# Patient Record
Sex: MALE | Race: WHITE | NOT HISPANIC OR LATINO | Employment: OTHER | URBAN - NONMETROPOLITAN AREA
[De-identification: names, ages, dates, MRNs, and addresses within clinical notes are randomized per-mention and may not be internally consistent; named-entity substitution may affect disease eponyms.]

---

## 2017-02-14 ENCOUNTER — APPOINTMENT (OUTPATIENT)
Dept: PHYSICAL THERAPY | Facility: HOSPITAL | Age: 77
End: 2017-02-14

## 2017-02-22 ENCOUNTER — HOSPITAL ENCOUNTER (OUTPATIENT)
Dept: PHYSICAL THERAPY | Facility: HOSPITAL | Age: 77
Setting detail: THERAPIES SERIES
Discharge: HOME | End: 2017-02-22

## 2017-02-22 DIAGNOSIS — M79.604 LUMBAR PAIN WITH RADIATION DOWN RIGHT LEG: Primary | ICD-10-CM

## 2017-02-22 DIAGNOSIS — M54.50 LUMBAR PAIN WITH RADIATION DOWN RIGHT LEG: Primary | ICD-10-CM

## 2017-02-22 DIAGNOSIS — G89.29 CHRONIC PAIN OF RIGHT KNEE: ICD-10-CM

## 2017-02-22 DIAGNOSIS — M25.561 CHRONIC PAIN OF RIGHT KNEE: ICD-10-CM

## 2017-02-22 PROCEDURE — 97010 HOT OR COLD PACKS THERAPY: CPT

## 2017-02-22 PROCEDURE — 97163 PT EVAL HIGH COMPLEX 45 MIN: CPT

## 2017-02-22 PROCEDURE — G0283 ELEC STIM OTHER THAN WOUND: HCPCS

## 2017-02-22 NOTE — PROGRESS NOTES
Outpatient Physical Therapy Ortho Initial Evaluation  MICKY Lacey     Patient Name: Leodan Posadas  : 1940  MRN: [id number]  Today's Date: 2017      Visit Date: 2017    Patient Active Problem List   Diagnosis   • CAD (coronary artery disease), native coronary artery   • Hyperlipidemia   • Hypertension   • PAC (premature atrial contraction)   • Medication intolerance   • Status post coronary artery stent placement        Past Medical History   Diagnosis Date   • CAD (coronary artery disease)      s/p CABG with occluded grafts   • Hyperlipidemia    • Hypertension         Past Surgical History   Procedure Laterality Date   • Coronary artery bypass graft         Visit Dx:     ICD-10-CM ICD-9-CM   1. Lumbar pain with radiation down right leg M54.5 724.2   2. Chronic pain of right knee M25.561 719.46    G89.29 338.29             Patient History       17 1300          History    Chief Complaint Difficulty Walking;Pain;Joint stiffness;Difficulty with daily activities  -RT      Type of Pain Back pain;Lower Extremity / Leg;Knee pain   right  -RT      Date Current Problem(s) Began --   Summer 2004  -RT      Brief Description of Current Complaint Pt suffered a lumbar and right knee injury while lifting a faulty door while working at [employer] during the Summer of 2004.  Pt reports his pain has increased over the past six months.  The patient was recently evaluated by MD Quevedo and was advised to initiate therapy.  -RT      Patient/Caregiver Goals Relieve pain;Return to prior level of function;Improve mobility;Improve strength  -RT      Current Tobacco Use no  -RT      Smoking Status no  -RT      Patient's Rating of General Health Good  -RT      Hand Dominance right-handed  -RT      How has patient tried to help current problem? meds, rest, heat  -RT      What clinical tests have you had for this problem? X-ray  -RT      Results of Clinical Tests oa right knee  -RT      Pain     Pain Location  Back;Knee  -RT      Pain at Present 8  -RT      Pain at Best 5  -RT      Pain at Worst 10  -RT      Pain Frequency Constant/continuous  -RT      Pain Description Aching;Stabbing  -RT      What Performance Factors Make the Current Problem(s) WORSE? standing, walking, squatting  -RT      Tolerance Time- Standing 5min  -RT      Tolerance Time- Sitting 15min  -RT      Fall Risk Assessment    Any falls in the past year: Yes  -RT      Number of falls reported in the last 12 months 5  -RT      Factors that contributed to the fall: Lost balance;Tripped  -RT      Daily Activities    Primary Language English  -RT      How does patient learn best? Listening;Reading;Demonstration;Pictures/Video  -RT      Safety    Are you being hurt, hit, or frightened by anyone at home or in your life? No  -RT      Are you being neglected by a caregiver No  -RT        User Key  (r) = Recorded By, (t) = Taken By, (c) = Cosigned By    Initials Name Provider Type    RT Giuliano Arora, PT Physical Therapist                PT Ortho       02/22/17 1300    Posture/Observations    Posture/Observations Comments increased thoracic kyphosis, left side leaning, righ tmedial and lateral knee edema;   -RT    Neural Tension Signs- Lower Quarter Clearing    Slump Right:;Positive  -RT    Sensory Screen for Light Touch- Lower Quarter Clearing    L1 (inguinal area) Bilateral:;Intact  -RT    L2 (anterior mid thigh) Bilateral:;Intact  -RT    L3 (distal anterior thigh) Bilateral:;Intact  -RT    L4 (medial lower leg/foot) Bilateral:;Intact  -RT    L5 (lateral lower leg/great toe) Bilateral:;Intact  -RT    S1 (bottom of foot) Bilateral:;Intact  -RT    Myotomal Screen- Lower Quarter Clearing    Hip flexion (L2) Right:;4- (Good -);Left:;4 (Good)  -RT    Knee extension (L3) Right:;3+ (Fair +);Left:;4 (Good)  -RT    Knee flexion (S2) Right:;3+ (Fair +);Left:;4 (Good)  -RT    Lumbosacral Palpation    Lumbosacral Palpation? --   tenderness noted along l5-S1; right knee  med and lat jt line  -RT    ROM (Range of Motion)    General ROM Detail left knee 0-122; right -17-92  -RT      User Key  (r) = Recorded By, (t) = Taken By, (c) = Cosigned By    Initials Name Provider Type    RT Giuliano Arora PT Physical Therapist                            Therapy Education       02/22/17 1447    Therapy Education    Given HEP;Symptoms/condition management;Pain management;Posture/body mechanics  -RT    Program New  -RT    How Provided Verbal;Demonstration;Written  -RT    Provided to Patient  -RT    Level of Understanding Teach back education performed;Verbalized;Demonstrated  -RT      User Key  (r) = Recorded By, (t) = Taken By, (c) = Cosigned By    Initials Name Provider Type    RT Giuliano Arora PT Physical Therapist                PT OP Goals       02/22/17 1400          PT Short Term Goals    STG Date to Achieve 03/08/17  -RT      STG 1 Pt will be instructed in a HEP.  -RT      STG 1 Progress New  -RT      STG 2 Pt will demonstrate right knee ROM .  -RT      STG 2 Progress New  -RT      STG 3 Pt will report pain at average to be 5/10.  -RT      STG 3 Progress New  -RT      Long Term Goals    LTG Date to Achieve 03/22/17  -RT      LTG 1 Pt will be instructed in lifting precuations.  -RT      LTG 1 Progress New  -RT      LTG 2 Pt will demonstrate 4/5 right knee strength.  -RT      LTG 2 Progress New  -RT      LTG 3 Pt will report pain no greater than 5/10 at worst.  -RT      LTG 3 Progress New  -RT      Time Calculation    PT Goal Re-Cert Due Date 03/22/17  -RT        User Key  (r) = Recorded By, (t) = Taken By, (c) = Cosigned By    Initials Name Provider Type    RT Giuliano Arora, PT Physical Therapist                PT Assessment/Plan       02/22/17 1451          PT Assessment    Functional Limitations Performance in leisure activities;Performance in self-care ADL;Limitations in functional capacity and performance  -RT      Impairments Pain;Balance;Gait;Range of motion;Poor body  mechanics;Muscle strength;Sensation  -RT      Assessment Comments Pt is a 78 y/o male referred to therapy for treatment of back and right knee pain.  Pt presents with decreased knee ROM, increased lumbar and knee pain and decreased core and knee stability.  Pt will benefit from therapy services for improved ADLs and decreased pain.   -RT      Rehab Potential Good  -RT      Patient/caregiver participated in establishment of treatment plan and goals Yes  -RT      Patient would benefit from skilled therapy intervention Yes  -RT      PT Plan    PT Frequency 2x/week  -RT      Predicted Duration of Therapy Intervention (days/wks) 4 weeks  -RT      Planned CPT's? PT EVAL: 68786;PT RE-EVAL: 97983;PT THER PROC EA 15 MIN: 28746;PT THER ACT EA 15 MIN: 42134;PT MANUAL THERAPY EA 15 MIN: 30299;PT NEUROMUSC RE-EDUCATION EA 15 MIN: 00720;PT GAIT TRAINING EA 15 MIN: 47318;PT ELECTRICAL STIM UNATTEND: ;PT ULTRASOUND EA 15 MIN: 53055;PT HOT/COLD PACK WC NONMCARE: 97114;PT TRACTION LUMBAR: 77830  -RT      Physical Therapy Interventions (Optional Details) gait training;neuromuscular re-education;modalities;manual therapy techniques;lumbar stabilization;joint mobilization;home exercise program;patient/family education;postural re-education;ROM (Range of Motion);strengthening;stretching  -RT      PT Plan Comments Will follow for optimal gains.  -RT        User Key  (r) = Recorded By, (t) = Taken By, (c) = Cosigned By    Initials Name Provider Type    RT Giuliano Arora PT Physical Therapist                                  Time Calculation:   Start Time: 1320  Stop Time: 1425  Time Calculation (min): 65 min     Therapy Charges for Today     Code Description Service Date Service Provider Modifiers Qty    78801478242 HC PT EVAL HIGH COMPLEXITY 3 2/22/2017 Giuliano Arora, PT GP 1    18081584311 HC ELECTRICAL STIM UNATTENDED 2/22/2017 Giuliano Arora, PT  1    78273209156 HC PT HOT/COLD PACK WC NONMCARE 2/22/2017 Giuliano Arora PT  GP 1                    Giuliano rAora, PT  2/22/2017

## 2017-02-28 ENCOUNTER — HOSPITAL ENCOUNTER (OUTPATIENT)
Dept: PHYSICAL THERAPY | Facility: HOSPITAL | Age: 77
Setting detail: THERAPIES SERIES
Discharge: HOME | End: 2017-02-28

## 2017-02-28 DIAGNOSIS — M54.50 LUMBAR PAIN WITH RADIATION DOWN RIGHT LEG: Primary | ICD-10-CM

## 2017-02-28 DIAGNOSIS — M79.604 LUMBAR PAIN WITH RADIATION DOWN RIGHT LEG: Primary | ICD-10-CM

## 2017-02-28 DIAGNOSIS — M25.561 CHRONIC PAIN OF RIGHT KNEE: ICD-10-CM

## 2017-02-28 DIAGNOSIS — G89.29 CHRONIC PAIN OF RIGHT KNEE: ICD-10-CM

## 2017-02-28 PROCEDURE — 97035 APP MDLTY 1+ULTRASOUND EA 15: CPT

## 2017-02-28 PROCEDURE — 97110 THERAPEUTIC EXERCISES: CPT

## 2017-02-28 PROCEDURE — 97010 HOT OR COLD PACKS THERAPY: CPT

## 2017-02-28 PROCEDURE — G0283 ELEC STIM OTHER THAN WOUND: HCPCS

## 2017-03-10 ENCOUNTER — APPOINTMENT (OUTPATIENT)
Dept: PHYSICAL THERAPY | Facility: HOSPITAL | Age: 77
End: 2017-03-10

## 2017-03-13 ENCOUNTER — TELEPHONE (OUTPATIENT)
Dept: CARDIOLOGY | Facility: CLINIC | Age: 77
End: 2017-03-13

## 2017-03-21 RX ORDER — CLOPIDOGREL BISULFATE 75 MG/1
75 TABLET ORAL DAILY
Qty: 30 TABLET | Refills: 11 | Status: SHIPPED | OUTPATIENT
Start: 2017-03-21

## 2017-03-22 ENCOUNTER — DOCUMENTATION (OUTPATIENT)
Dept: PHYSICAL THERAPY | Facility: HOSPITAL | Age: 77
End: 2017-03-22

## 2017-03-22 DIAGNOSIS — M54.50 LUMBAR PAIN WITH RADIATION DOWN RIGHT LEG: Primary | ICD-10-CM

## 2017-03-22 DIAGNOSIS — G89.29 CHRONIC PAIN OF RIGHT KNEE: ICD-10-CM

## 2017-03-22 DIAGNOSIS — M25.561 CHRONIC PAIN OF RIGHT KNEE: ICD-10-CM

## 2017-03-22 DIAGNOSIS — M79.604 LUMBAR PAIN WITH RADIATION DOWN RIGHT LEG: Primary | ICD-10-CM

## 2017-03-22 NOTE — THERAPY DISCHARGE NOTE
Outpatient Physical Therapy Discharge Summary         Patient Name: Leodan Posadas  : 1940  MRN: [id number]    Today's Date: 3/22/2017    Visit Dx:    ICD-10-CM ICD-9-CM   1. Lumbar pain with radiation down right leg M54.5 724.2   2. Chronic pain of right knee M25.561 719.46    G89.29 338.29           OP PT Discharge Summary  Date of Discharge: 17  Reason for Discharge: Unable to pay/insurance denied care  Outcomes Achieved: Unable to make functional progress toward goals at this time  Discharge Instructions: Insurance denied visits and pt will be discharged at this time.      Time Calculation:                    Giuliano Arora, PT  3/22/2017

## 2017-03-27 ENCOUNTER — TELEPHONE (OUTPATIENT)
Dept: CARDIOLOGY | Facility: CLINIC | Age: 77
End: 2017-03-27

## 2017-05-29 ENCOUNTER — HOSPITAL ENCOUNTER (OUTPATIENT)
Facility: HOSPITAL | Age: 77
Setting detail: OBSERVATION
Discharge: HOME | End: 2017-05-30
Attending: FAMILY MEDICINE | Admitting: FAMILY MEDICINE

## 2017-05-29 ENCOUNTER — APPOINTMENT (OUTPATIENT)
Dept: GENERAL RADIOLOGY | Facility: HOSPITAL | Age: 77
End: 2017-05-29

## 2017-05-29 DIAGNOSIS — R07.9 CHEST PAIN WITH MODERATE RISK FOR CARDIAC ETIOLOGY: Primary | ICD-10-CM

## 2017-05-29 LAB
6-ACETYL MORPHINE: NEGATIVE
ALBUMIN SERPL-MCNC: 4.4 G/DL (ref 3.4–4.8)
ALBUMIN/GLOB SERPL: 1.5 G/DL (ref 1.5–2.5)
ALP SERPL-CCNC: 74 U/L (ref 40–129)
ALT SERPL W P-5'-P-CCNC: 11 U/L (ref 10–44)
AMPHET+METHAMPHET UR QL: NEGATIVE
ANION GAP SERPL CALCULATED.3IONS-SCNC: 6.9 MMOL/L (ref 3.6–11.2)
APTT PPP: 28.2 SECONDS (ref 24.4–31)
AST SERPL-CCNC: 17 U/L (ref 10–34)
BARBITURATES UR QL SCN: NEGATIVE
BASOPHILS # BLD AUTO: 0.03 10*3/MM3 (ref 0–0.3)
BASOPHILS NFR BLD AUTO: 0.3 % (ref 0–2)
BENZODIAZ UR QL SCN: NEGATIVE
BILIRUB SERPL-MCNC: 0.4 MG/DL (ref 0.2–1.8)
BILIRUB UR QL STRIP: NEGATIVE
BUN BLD-MCNC: 17 MG/DL (ref 7–21)
BUN/CREAT SERPL: 16 (ref 7–25)
BUPRENORPHINE SERPL-MCNC: NEGATIVE NG/ML
CALCIUM SPEC-SCNC: 9.7 MG/DL (ref 7.7–10)
CANNABINOIDS SERPL QL: NEGATIVE
CHLORIDE SERPL-SCNC: 106 MMOL/L (ref 99–112)
CK MB SERPL-CCNC: 1.13 NG/ML (ref 0–5)
CK MB SERPL-CCNC: 1.42 NG/ML (ref 0–5)
CK MB SERPL-CCNC: 1.59 NG/ML (ref 0–5)
CK MB SERPL-RTO: 2.2 % (ref 0–3)
CK MB SERPL-RTO: 2.4 % (ref 0–3)
CK MB SERPL-RTO: 2.8 % (ref 0–3)
CK SERPL-CCNC: 51 U/L (ref 24–204)
CK SERPL-CCNC: 56 U/L (ref 24–204)
CK SERPL-CCNC: 59 U/L (ref 24–204)
CLARITY UR: CLEAR
CO2 SERPL-SCNC: 30.1 MMOL/L (ref 24.3–31.9)
COCAINE UR QL: NEGATIVE
COLOR UR: YELLOW
CREAT BLD-MCNC: 1.06 MG/DL (ref 0.43–1.29)
DEPRECATED RDW RBC AUTO: 44 FL (ref 37–54)
EOSINOPHIL # BLD AUTO: 0.19 10*3/MM3 (ref 0–0.7)
EOSINOPHIL NFR BLD AUTO: 2.1 % (ref 0–7)
ERYTHROCYTE [DISTWIDTH] IN BLOOD BY AUTOMATED COUNT: 13.6 % (ref 11.5–14.5)
GFR SERPL CREATININE-BSD FRML MDRD: 68 ML/MIN/1.73
GLOBULIN UR ELPH-MCNC: 2.9 GM/DL
GLUCOSE BLD-MCNC: 102 MG/DL (ref 70–110)
GLUCOSE UR STRIP-MCNC: NEGATIVE MG/DL
HCT VFR BLD AUTO: 46 % (ref 42–52)
HGB BLD-MCNC: 15.3 G/DL (ref 14–18)
HGB UR QL STRIP.AUTO: NEGATIVE
IMM GRANULOCYTES # BLD: 0.02 10*3/MM3 (ref 0–0.03)
IMM GRANULOCYTES NFR BLD: 0.2 % (ref 0–0.5)
INR PPP: 0.92 (ref 0.8–1.1)
KETONES UR QL STRIP: NEGATIVE
LEUKOCYTE ESTERASE UR QL STRIP.AUTO: NEGATIVE
LYMPHOCYTES # BLD AUTO: 2.81 10*3/MM3 (ref 1–3)
LYMPHOCYTES NFR BLD AUTO: 31.6 % (ref 16–46)
MCH RBC QN AUTO: 29.5 PG (ref 27–33)
MCHC RBC AUTO-ENTMCNC: 33.3 G/DL (ref 33–37)
MCV RBC AUTO: 88.6 FL (ref 80–94)
MDMA UR QL SCN: NEGATIVE
METHADONE UR QL SCN: NEGATIVE
MONOCYTES # BLD AUTO: 0.88 10*3/MM3 (ref 0.1–0.9)
MONOCYTES NFR BLD AUTO: 9.9 % (ref 0–12)
NEUTROPHILS # BLD AUTO: 4.96 10*3/MM3 (ref 1.4–6.5)
NEUTROPHILS NFR BLD AUTO: 55.9 % (ref 40–75)
NITRITE UR QL STRIP: NEGATIVE
OPIATES UR QL: NEGATIVE
OSMOLALITY SERPL CALC.SUM OF ELEC: 286.7 MOSM/KG (ref 273–305)
OXYCODONE UR QL SCN: NEGATIVE
PCP UR QL SCN: NEGATIVE
PH UR STRIP.AUTO: 5.5 [PH] (ref 5–8)
PLATELET # BLD AUTO: 206 10*3/MM3 (ref 130–400)
PMV BLD AUTO: 11.8 FL (ref 6–10)
POTASSIUM BLD-SCNC: 4.3 MMOL/L (ref 3.5–5.3)
PROT SERPL-MCNC: 7.3 G/DL (ref 6–8)
PROT UR QL STRIP: NEGATIVE
PROTHROMBIN TIME: 10.1 SECONDS (ref 9.8–11.9)
RBC # BLD AUTO: 5.19 10*6/MM3 (ref 4.7–6.1)
SODIUM BLD-SCNC: 143 MMOL/L (ref 135–153)
SP GR UR STRIP: <=1.005 (ref 1–1.03)
TROPONIN I SERPL-MCNC: <0.006 NG/ML
UROBILINOGEN UR QL STRIP: NORMAL
WBC NRBC COR # BLD: 8.89 10*3/MM3 (ref 4.5–12.5)

## 2017-05-29 PROCEDURE — 85025 COMPLETE CBC W/AUTO DIFF WBC: CPT | Performed by: FAMILY MEDICINE

## 2017-05-29 PROCEDURE — 80307 DRUG TEST PRSMV CHEM ANLYZR: CPT | Performed by: FAMILY MEDICINE

## 2017-05-29 PROCEDURE — 93005 ELECTROCARDIOGRAM TRACING: CPT | Performed by: FAMILY MEDICINE

## 2017-05-29 PROCEDURE — 93010 ELECTROCARDIOGRAM REPORT: CPT | Performed by: INTERNAL MEDICINE

## 2017-05-29 PROCEDURE — 82550 ASSAY OF CK (CPK): CPT | Performed by: FAMILY MEDICINE

## 2017-05-29 PROCEDURE — 99285 EMERGENCY DEPT VISIT HI MDM: CPT

## 2017-05-29 PROCEDURE — 71010 XR CHEST 1 VW: CPT | Performed by: RADIOLOGY

## 2017-05-29 PROCEDURE — 84484 ASSAY OF TROPONIN QUANT: CPT | Performed by: FAMILY MEDICINE

## 2017-05-29 PROCEDURE — 85730 THROMBOPLASTIN TIME PARTIAL: CPT | Performed by: FAMILY MEDICINE

## 2017-05-29 PROCEDURE — 81003 URINALYSIS AUTO W/O SCOPE: CPT | Performed by: FAMILY MEDICINE

## 2017-05-29 PROCEDURE — 25010000002 ENOXAPARIN PER 10 MG: Performed by: FAMILY MEDICINE

## 2017-05-29 PROCEDURE — 80053 COMPREHEN METABOLIC PANEL: CPT | Performed by: FAMILY MEDICINE

## 2017-05-29 PROCEDURE — 71010 HC CHEST PA OR AP: CPT

## 2017-05-29 PROCEDURE — 82553 CREATINE MB FRACTION: CPT | Performed by: FAMILY MEDICINE

## 2017-05-29 PROCEDURE — 85610 PROTHROMBIN TIME: CPT | Performed by: FAMILY MEDICINE

## 2017-05-29 PROCEDURE — G0378 HOSPITAL OBSERVATION PER HR: HCPCS

## 2017-05-29 PROCEDURE — 87086 URINE CULTURE/COLONY COUNT: CPT | Performed by: FAMILY MEDICINE

## 2017-05-29 PROCEDURE — 99204 OFFICE O/P NEW MOD 45 MIN: CPT | Performed by: INTERNAL MEDICINE

## 2017-05-29 RX ORDER — METOPROLOL SUCCINATE 25 MG/1
12.5 TABLET, EXTENDED RELEASE ORAL NIGHTLY
Status: DISCONTINUED | OUTPATIENT
Start: 2017-05-29 | End: 2017-05-30 | Stop reason: HOSPADM

## 2017-05-29 RX ORDER — METOPROLOL SUCCINATE 25 MG/1
25 TABLET, EXTENDED RELEASE ORAL DAILY
Status: DISCONTINUED | OUTPATIENT
Start: 2017-05-30 | End: 2017-05-30 | Stop reason: HOSPADM

## 2017-05-29 RX ORDER — ASPIRIN 81 MG/1
324 TABLET, CHEWABLE ORAL ONCE
Status: COMPLETED | OUTPATIENT
Start: 2017-05-29 | End: 2017-05-29

## 2017-05-29 RX ORDER — LANSOPRAZOLE 30 MG/1
30 CAPSULE, DELAYED RELEASE ORAL 2 TIMES DAILY
Status: CANCELLED | OUTPATIENT
Start: 2017-05-29

## 2017-05-29 RX ORDER — ENALAPRIL MALEATE 2.5 MG/1
2.5 TABLET ORAL DAILY
Status: DISCONTINUED | OUTPATIENT
Start: 2017-05-30 | End: 2017-05-30 | Stop reason: HOSPADM

## 2017-05-29 RX ORDER — HYDROCODONE BITARTRATE AND ACETAMINOPHEN 10; 325 MG/1; MG/1
1 TABLET ORAL DAILY PRN
Status: ON HOLD | COMMUNITY
End: 2017-05-29

## 2017-05-29 RX ORDER — SODIUM CHLORIDE 9 MG/ML
75 INJECTION, SOLUTION INTRAVENOUS CONTINUOUS
Status: DISCONTINUED | OUTPATIENT
Start: 2017-05-29 | End: 2017-05-30

## 2017-05-29 RX ORDER — SODIUM CHLORIDE 0.9 % (FLUSH) 0.9 %
10 SYRINGE (ML) INJECTION AS NEEDED
Status: DISCONTINUED | OUTPATIENT
Start: 2017-05-29 | End: 2017-05-30 | Stop reason: HOSPADM

## 2017-05-29 RX ORDER — LANSOPRAZOLE 30 MG/1
30 CAPSULE, DELAYED RELEASE ORAL
Status: DISCONTINUED | OUTPATIENT
Start: 2017-05-29 | End: 2017-05-30 | Stop reason: HOSPADM

## 2017-05-29 RX ORDER — METOPROLOL SUCCINATE 25 MG/1
12.5 TABLET, EXTENDED RELEASE ORAL 2 TIMES DAILY
COMMUNITY

## 2017-05-29 RX ORDER — CLOPIDOGREL BISULFATE 75 MG/1
75 TABLET ORAL DAILY
Status: DISCONTINUED | OUTPATIENT
Start: 2017-05-30 | End: 2017-05-30 | Stop reason: HOSPADM

## 2017-05-29 RX ORDER — HYDROCODONE BITARTRATE AND ACETAMINOPHEN 10; 325 MG/1; MG/1
1 TABLET ORAL DAILY PRN
Status: CANCELLED | OUTPATIENT
Start: 2017-05-29

## 2017-05-29 RX ADMIN — SODIUM CHLORIDE 75 ML/HR: 900 INJECTION, SOLUTION INTRAVENOUS at 15:47

## 2017-05-29 RX ADMIN — ASPIRIN 324 MG: 81 TABLET, CHEWABLE ORAL at 09:32

## 2017-05-29 RX ADMIN — ENOXAPARIN SODIUM 40 MG: 40 INJECTION SUBCUTANEOUS at 17:14

## 2017-05-29 RX ADMIN — METOPROLOL SUCCINATE 12.5 MG: 25 TABLET, FILM COATED, EXTENDED RELEASE ORAL at 19:42

## 2017-05-30 ENCOUNTER — APPOINTMENT (OUTPATIENT)
Dept: NUCLEAR MEDICINE | Facility: HOSPITAL | Age: 77
End: 2017-05-30
Attending: INTERNAL MEDICINE

## 2017-05-30 ENCOUNTER — APPOINTMENT (OUTPATIENT)
Dept: CARDIOLOGY | Facility: HOSPITAL | Age: 77
End: 2017-05-30
Attending: INTERNAL MEDICINE

## 2017-05-30 VITALS
BODY MASS INDEX: 26.78 KG/M2 | WEIGHT: 180.8 LBS | OXYGEN SATURATION: 97 % | DIASTOLIC BLOOD PRESSURE: 80 MMHG | SYSTOLIC BLOOD PRESSURE: 143 MMHG | HEART RATE: 75 BPM | TEMPERATURE: 97.9 F | RESPIRATION RATE: 20 BRPM | HEIGHT: 69 IN

## 2017-05-30 LAB
BH CV ECHO MEAS - % IVS THICK: 48.1 %
BH CV ECHO MEAS - % LVPW THICK: 37.5 %
BH CV ECHO MEAS - ACS: 2.3 CM
BH CV ECHO MEAS - AO ROOT AREA (BSA CORRECTED): 1.7
BH CV ECHO MEAS - AO ROOT AREA: 9.2 CM^2
BH CV ECHO MEAS - AO ROOT DIAM: 3.4 CM
BH CV ECHO MEAS - BSA(HAYCOCK): 2 M^2
BH CV ECHO MEAS - BSA: 2 M^2
BH CV ECHO MEAS - BZI_BMI: 26.6 KILOGRAMS/M^2
BH CV ECHO MEAS - BZI_METRIC_HEIGHT: 175.3 CM
BH CV ECHO MEAS - BZI_METRIC_WEIGHT: 81.6 KG
BH CV ECHO MEAS - CONTRAST EF 4CH: 52.2 ML/M^2
BH CV ECHO MEAS - EDV(CUBED): 133.2 ML
BH CV ECHO MEAS - EDV(MOD-SP4): 23 ML
BH CV ECHO MEAS - EDV(TEICH): 124.2 ML
BH CV ECHO MEAS - EF(CUBED): 56.9 %
BH CV ECHO MEAS - EF(MOD-SP4): 52.2 %
BH CV ECHO MEAS - EF(TEICH): 48.3 %
BH CV ECHO MEAS - ESV(CUBED): 57.4 ML
BH CV ECHO MEAS - ESV(MOD-SP4): 11 ML
BH CV ECHO MEAS - ESV(TEICH): 64.2 ML
BH CV ECHO MEAS - FS: 24.5 %
BH CV ECHO MEAS - IVS/LVPW: 0.86
BH CV ECHO MEAS - IVSD: 0.95 CM
BH CV ECHO MEAS - IVSS: 1.4 CM
BH CV ECHO MEAS - LA DIMENSION: 2.6 CM
BH CV ECHO MEAS - LA/AO: 0.74
BH CV ECHO MEAS - LV DIASTOLIC VOL/BSA (35-75): 11.6 ML/M^2
BH CV ECHO MEAS - LV MASS(C)D: 197.1 GRAMS
BH CV ECHO MEAS - LV MASS(C)DI: 99.8 GRAMS/M^2
BH CV ECHO MEAS - LV MASS(C)S: 214.7 GRAMS
BH CV ECHO MEAS - LV MASS(C)SI: 108.7 GRAMS/M^2
BH CV ECHO MEAS - LV SYSTOLIC VOL/BSA (12-30): 5.6 ML/M^2
BH CV ECHO MEAS - LVIDD: 5.1 CM
BH CV ECHO MEAS - LVIDS: 3.9 CM
BH CV ECHO MEAS - LVLD AP4: 5.2 CM
BH CV ECHO MEAS - LVLS AP4: 5 CM
BH CV ECHO MEAS - LVOT AREA (M): 3.1 CM^2
BH CV ECHO MEAS - LVOT AREA: 3.2 CM^2
BH CV ECHO MEAS - LVOT DIAM: 2 CM
BH CV ECHO MEAS - LVPWD: 1.1 CM
BH CV ECHO MEAS - LVPWS: 1.5 CM
BH CV ECHO MEAS - MV A MAX VEL: 92.8 CM/SEC
BH CV ECHO MEAS - MV E MAX VEL: 76.5 CM/SEC
BH CV ECHO MEAS - MV E/A: 0.82
BH CV ECHO MEAS - PA ACC SLOPE: 1203 CM/SEC^2
BH CV ECHO MEAS - PA ACC TIME: 0.07 SEC
BH CV ECHO MEAS - PA PR(ACCEL): 47.3 MMHG
BH CV ECHO MEAS - RAP SYSTOLE: 10 MMHG
BH CV ECHO MEAS - RVSP: 36.5 MMHG
BH CV ECHO MEAS - SI(CUBED): 38.4 ML/M^2
BH CV ECHO MEAS - SI(MOD-SP4): 6.1 ML/M^2
BH CV ECHO MEAS - SI(TEICH): 30.4 ML/M^2
BH CV ECHO MEAS - SV(CUBED): 75.8 ML
BH CV ECHO MEAS - SV(MOD-SP4): 12 ML
BH CV ECHO MEAS - SV(TEICH): 60 ML
BH CV ECHO MEAS - TR MAX VEL: 257.2 CM/SEC
BH CV NUCLEAR PRIOR STUDY: 3
BH CV STRESS BP STAGE 1: NORMAL
BH CV STRESS BP STAGE 2: NORMAL
BH CV STRESS DURATION MIN STAGE 1: 3
BH CV STRESS DURATION MIN STAGE 2: 3
BH CV STRESS DURATION SEC STAGE 1: 0
BH CV STRESS DURATION SEC STAGE 2: 0
BH CV STRESS GRADE STAGE 1: 10
BH CV STRESS GRADE STAGE 2: 12
BH CV STRESS HR STAGE 1: 120
BH CV STRESS HR STAGE 2: 132
BH CV STRESS METS STAGE 1: 4.6
BH CV STRESS METS STAGE 2: 7
BH CV STRESS PROTOCOL 1: NORMAL
BH CV STRESS RECOVERY BP: NORMAL MMHG
BH CV STRESS RECOVERY HR: 75 BPM
BH CV STRESS SPEED STAGE 1: 1.7
BH CV STRESS SPEED STAGE 2: 2.5
BH CV STRESS STAGE 1: 1
BH CV STRESS STAGE 2: 2
CHOLEST SERPL-MCNC: 220 MG/DL (ref 0–200)
HDLC SERPL-MCNC: 43 MG/DL (ref 60–100)
LDLC SERPL CALC-MCNC: 150 MG/DL (ref 0–100)
LDLC/HDLC SERPL: 3.49 {RATIO}
LV EF NUC BP: 70 %
MAXIMAL PREDICTED HEART RATE: 145 BPM
PERCENT MAX PREDICTED HR: 96.55 %
STRESS BASELINE BP: NORMAL MMHG
STRESS BASELINE HR: 61 BPM
STRESS PERCENT HR: 114 %
STRESS POST ESTIMATED WORKLOAD: 7 METS
STRESS POST EXERCISE DUR MIN: 4 MIN
STRESS POST EXERCISE DUR SEC: 30 SEC
STRESS POST PEAK BP: NORMAL MMHG
STRESS POST PEAK HR: 140 BPM
STRESS TARGET HR: 123 BPM
TRIGL SERPL-MCNC: 135 MG/DL (ref 0–150)
TROPONIN I SERPL-MCNC: <0.006 NG/ML
TROPONIN I SERPL-MCNC: <0.006 NG/ML
VLDLC SERPL-MCNC: 27 MG/DL

## 2017-05-30 PROCEDURE — 93306 TTE W/DOPPLER COMPLETE: CPT | Performed by: INTERNAL MEDICINE

## 2017-05-30 PROCEDURE — G0378 HOSPITAL OBSERVATION PER HR: HCPCS

## 2017-05-30 PROCEDURE — 78451 HT MUSCLE IMAGE SPECT SING: CPT

## 2017-05-30 PROCEDURE — 80061 LIPID PANEL: CPT | Performed by: INTERNAL MEDICINE

## 2017-05-30 PROCEDURE — 78452 HT MUSCLE IMAGE SPECT MULT: CPT | Performed by: INTERNAL MEDICINE

## 2017-05-30 PROCEDURE — 93306 TTE W/DOPPLER COMPLETE: CPT

## 2017-05-30 PROCEDURE — 93017 CV STRESS TEST TRACING ONLY: CPT

## 2017-05-30 PROCEDURE — 84484 ASSAY OF TROPONIN QUANT: CPT | Performed by: FAMILY MEDICINE

## 2017-05-30 PROCEDURE — 93018 CV STRESS TEST I&R ONLY: CPT | Performed by: INTERNAL MEDICINE

## 2017-05-30 PROCEDURE — 0 TECHNETIUM SESTAMIBI: Performed by: FAMILY MEDICINE

## 2017-05-30 PROCEDURE — A9500 TC99M SESTAMIBI: HCPCS | Performed by: FAMILY MEDICINE

## 2017-05-30 RX ADMIN — Medication 1 DOSE: at 10:40

## 2017-05-30 RX ADMIN — CLOPIDOGREL BISULFATE 75 MG: 75 TABLET, FILM COATED ORAL at 12:28

## 2017-05-30 RX ADMIN — ENALAPRIL MALEATE 2.5 MG: 2.5 TABLET ORAL at 12:28

## 2017-05-30 RX ADMIN — Medication 1 DOSE: at 08:45

## 2017-05-30 RX ADMIN — METOPROLOL SUCCINATE 25 MG: 25 TABLET, FILM COATED, EXTENDED RELEASE ORAL at 12:28

## 2017-05-30 RX ADMIN — SODIUM CHLORIDE 75 ML/HR: 900 INJECTION, SOLUTION INTRAVENOUS at 04:13

## 2017-05-31 LAB — BACTERIA SPEC AEROBE CULT: NORMAL

## 2017-08-27 ENCOUNTER — APPOINTMENT (OUTPATIENT)
Dept: GENERAL RADIOLOGY | Facility: HOSPITAL | Age: 77
End: 2017-08-27

## 2017-08-27 ENCOUNTER — HOSPITAL ENCOUNTER (EMERGENCY)
Facility: HOSPITAL | Age: 77
Discharge: HOME | End: 2017-08-27
Attending: FAMILY MEDICINE | Admitting: FAMILY MEDICINE

## 2017-08-27 VITALS
HEIGHT: 68 IN | WEIGHT: 171 LBS | OXYGEN SATURATION: 98 % | TEMPERATURE: 98.2 F | BODY MASS INDEX: 25.91 KG/M2 | SYSTOLIC BLOOD PRESSURE: 158 MMHG | DIASTOLIC BLOOD PRESSURE: 88 MMHG | RESPIRATION RATE: 18 BRPM | HEART RATE: 60 BPM

## 2017-08-27 DIAGNOSIS — R00.2 PALPITATIONS: Primary | ICD-10-CM

## 2017-08-27 LAB
ALBUMIN SERPL-MCNC: 4.4 G/DL (ref 3.4–4.8)
ALBUMIN/GLOB SERPL: 1.6 G/DL (ref 1.5–2.5)
ALP SERPL-CCNC: 70 U/L (ref 40–129)
ALT SERPL W P-5'-P-CCNC: 17 U/L (ref 10–44)
ANION GAP SERPL CALCULATED.3IONS-SCNC: 4.6 MMOL/L (ref 3.6–11.2)
APTT PPP: 32.5 SECONDS (ref 23.8–36.1)
AST SERPL-CCNC: 22 U/L (ref 10–34)
BASOPHILS # BLD AUTO: 0.03 10*3/MM3 (ref 0–0.3)
BASOPHILS NFR BLD AUTO: 0.3 % (ref 0–2)
BILIRUB SERPL-MCNC: 0.5 MG/DL (ref 0.2–1.8)
BILIRUB UR QL STRIP: NEGATIVE
BUN BLD-MCNC: 19 MG/DL (ref 7–21)
BUN/CREAT SERPL: 19.6 (ref 7–25)
CALCIUM SPEC-SCNC: 9.7 MG/DL (ref 7.7–10)
CHLORIDE SERPL-SCNC: 106 MMOL/L (ref 99–112)
CK MB SERPL-CCNC: 1.3 NG/ML (ref 0–5)
CK MB SERPL-CCNC: 1.63 NG/ML (ref 0–5)
CK MB SERPL-RTO: 2 % (ref 0–3)
CK MB SERPL-RTO: 2.7 % (ref 0–3)
CK SERPL-CCNC: 60 U/L (ref 24–204)
CK SERPL-CCNC: 64 U/L (ref 24–204)
CLARITY UR: CLEAR
CO2 SERPL-SCNC: 28.4 MMOL/L (ref 24.3–31.9)
COLOR UR: YELLOW
CREAT BLD-MCNC: 0.97 MG/DL (ref 0.43–1.29)
DEPRECATED RDW RBC AUTO: 45.1 FL (ref 37–54)
EOSINOPHIL # BLD AUTO: 0.17 10*3/MM3 (ref 0–0.7)
EOSINOPHIL NFR BLD AUTO: 1.9 % (ref 0–7)
ERYTHROCYTE [DISTWIDTH] IN BLOOD BY AUTOMATED COUNT: 13.9 % (ref 11.5–14.5)
GFR SERPL CREATININE-BSD FRML MDRD: 75 ML/MIN/1.73
GLOBULIN UR ELPH-MCNC: 2.7 GM/DL
GLUCOSE BLD-MCNC: 98 MG/DL (ref 70–110)
GLUCOSE UR STRIP-MCNC: NEGATIVE MG/DL
HCT VFR BLD AUTO: 43.6 % (ref 42–52)
HGB BLD-MCNC: 14.5 G/DL (ref 14–18)
HGB UR QL STRIP.AUTO: NEGATIVE
IMM GRANULOCYTES # BLD: 0.02 10*3/MM3 (ref 0–0.03)
IMM GRANULOCYTES NFR BLD: 0.2 % (ref 0–0.5)
INR PPP: 0.95 (ref 0.9–1.1)
KETONES UR QL STRIP: NEGATIVE
LEUKOCYTE ESTERASE UR QL STRIP.AUTO: NEGATIVE
LYMPHOCYTES # BLD AUTO: 2.6 10*3/MM3 (ref 1–3)
LYMPHOCYTES NFR BLD AUTO: 29.6 % (ref 16–46)
MCH RBC QN AUTO: 29.8 PG (ref 27–33)
MCHC RBC AUTO-ENTMCNC: 33.3 G/DL (ref 33–37)
MCV RBC AUTO: 89.5 FL (ref 80–94)
MONOCYTES # BLD AUTO: 0.87 10*3/MM3 (ref 0.1–0.9)
MONOCYTES NFR BLD AUTO: 9.9 % (ref 0–12)
NEUTROPHILS # BLD AUTO: 5.09 10*3/MM3 (ref 1.4–6.5)
NEUTROPHILS NFR BLD AUTO: 58.1 % (ref 40–75)
NITRITE UR QL STRIP: NEGATIVE
OSMOLALITY SERPL CALC.SUM OF ELEC: 279.8 MOSM/KG (ref 273–305)
PH UR STRIP.AUTO: <=5 [PH] (ref 5–8)
PLATELET # BLD AUTO: 185 10*3/MM3 (ref 130–400)
PMV BLD AUTO: 11.4 FL (ref 6–10)
POTASSIUM BLD-SCNC: 4.4 MMOL/L (ref 3.5–5.3)
PROT SERPL-MCNC: 7.1 G/DL (ref 6–8)
PROT UR QL STRIP: NEGATIVE
PROTHROMBIN TIME: 12.8 SECONDS (ref 11–15.4)
RBC # BLD AUTO: 4.87 10*6/MM3 (ref 4.7–6.1)
SODIUM BLD-SCNC: 139 MMOL/L (ref 135–153)
SP GR UR STRIP: 1.01 (ref 1–1.03)
TROPONIN I SERPL-MCNC: <0.006 NG/ML
TROPONIN I SERPL-MCNC: <0.006 NG/ML
UROBILINOGEN UR QL STRIP: NORMAL
WBC NRBC COR # BLD: 8.78 10*3/MM3 (ref 4.5–12.5)

## 2017-08-27 PROCEDURE — 85025 COMPLETE CBC W/AUTO DIFF WBC: CPT | Performed by: FAMILY MEDICINE

## 2017-08-27 PROCEDURE — 93010 ELECTROCARDIOGRAM REPORT: CPT | Performed by: INTERNAL MEDICINE

## 2017-08-27 PROCEDURE — 85610 PROTHROMBIN TIME: CPT | Performed by: FAMILY MEDICINE

## 2017-08-27 PROCEDURE — 87086 URINE CULTURE/COLONY COUNT: CPT | Performed by: FAMILY MEDICINE

## 2017-08-27 PROCEDURE — 93005 ELECTROCARDIOGRAM TRACING: CPT | Performed by: FAMILY MEDICINE

## 2017-08-27 PROCEDURE — 85730 THROMBOPLASTIN TIME PARTIAL: CPT | Performed by: FAMILY MEDICINE

## 2017-08-27 PROCEDURE — 81003 URINALYSIS AUTO W/O SCOPE: CPT | Performed by: FAMILY MEDICINE

## 2017-08-27 PROCEDURE — 82553 CREATINE MB FRACTION: CPT | Performed by: FAMILY MEDICINE

## 2017-08-27 PROCEDURE — 71010 HC CHEST PA OR AP: CPT

## 2017-08-27 PROCEDURE — 80053 COMPREHEN METABOLIC PANEL: CPT | Performed by: FAMILY MEDICINE

## 2017-08-27 PROCEDURE — 99284 EMERGENCY DEPT VISIT MOD MDM: CPT

## 2017-08-27 PROCEDURE — 84484 ASSAY OF TROPONIN QUANT: CPT | Performed by: FAMILY MEDICINE

## 2017-08-27 PROCEDURE — 82550 ASSAY OF CK (CPK): CPT | Performed by: FAMILY MEDICINE

## 2017-08-27 PROCEDURE — 71010 XR CHEST 1 VW: CPT | Performed by: RADIOLOGY

## 2017-08-27 RX ORDER — SODIUM CHLORIDE 0.9 % (FLUSH) 0.9 %
10 SYRINGE (ML) INJECTION AS NEEDED
Status: DISCONTINUED | OUTPATIENT
Start: 2017-08-27 | End: 2017-08-27 | Stop reason: HOSPADM

## 2017-08-27 RX ORDER — ASPIRIN 81 MG/1
324 TABLET, CHEWABLE ORAL ONCE
Status: COMPLETED | OUTPATIENT
Start: 2017-08-27 | End: 2017-08-27

## 2017-08-27 RX ADMIN — ASPIRIN 324 MG: 81 TABLET, CHEWABLE ORAL at 11:07

## 2017-08-27 NOTE — ED NOTES
Patient resting on stretcher, NAD noted; no complaints of pain or discomfort; denies any chest pain or shortness of breath; alert and oriented x 4; wife remains at bedside; will continue to monitor patient for any changes.     Cynthia Coon RN  08/27/17 5279

## 2017-08-27 NOTE — ED NOTES
"Amb to Rm 113 with c/o heart palpitations and \"fluttering\" x 2 weeks with episodes increasing in frequency and lasting longer; denies shortness and denies any chest pain; denies any nausea; denies dizziness; resp even and non labored.      Cynthia Coon RN  08/27/17 1055    "

## 2017-08-27 NOTE — ED PROVIDER NOTES
Subjective   Patient is a 77 y.o. male presenting with palpitations.   History provided by:  Patient and spouse  Palpitations   Palpitations quality:  Fast  Onset quality:  Insidious  Duration:  2 weeks  Timing:  Intermittent  Progression:  Worsening  Chronicity:  Recurrent  Context: not anxiety, not appetite suppressants, not blood loss, not bronchodilators, not caffeine, not dehydration, not exercise, not hyperventilation, not illicit drugs, not nicotine and not stimulant use    Relieved by:  Nothing  Worsened by:  Nothing  Ineffective treatments:  None tried  Associated symptoms: chest pressure    Associated symptoms: no back pain, no chest pain, no cough, no dizziness, no leg pain, no lower extremity edema, no malaise/fatigue, no nausea, no PND, no shortness of breath, no syncope, no vomiting and no weakness        Review of Systems   Constitutional: Negative for activity change, appetite change, chills, fatigue and malaise/fatigue.   HENT: Negative for congestion.    Eyes: Negative for pain.   Respiratory: Negative for cough, shortness of breath, wheezing and stridor.    Cardiovascular: Positive for palpitations. Negative for chest pain, syncope and PND.   Gastrointestinal: Negative for abdominal pain, diarrhea, nausea and vomiting.   Genitourinary: Negative for dysuria.   Musculoskeletal: Negative for arthralgias, back pain, myalgias, neck pain and neck stiffness.   Skin: Negative for rash.   Neurological: Negative for dizziness, syncope, speech difficulty, weakness and headaches.   Psychiatric/Behavioral: Negative for agitation.       Past Medical History:   Diagnosis Date   • Arthritis    • CAD (coronary artery disease)     s/p CABG with occluded grafts   • Carpal tunnel syndrome on both sides    • Hyperlipidemia    • Hypertension        Allergies   Allergen Reactions   • Penicillins Hives   • Codeine Hallucinations   • Percocet [Oxycodone-Acetaminophen] Other (See Comments)     Increases blood pressure   •  Pravastatin Sodium      Felt as if he couldn't walk.    • Valium [Diazepam]      Patient states it makes him crazy.       Past Surgical History:   Procedure Laterality Date   • CARDIAC CATHETERIZATION     • CORONARY ARTERY BYPASS GRAFT     • HERNIA REPAIR     • KNEE ARTHROSCOPY     • PERIPHERAL ARTERIAL STENT GRAFT     • SHOULDER SURGERY Right    • SINUS SURGERY         History reviewed. No pertinent family history.    Social History     Social History   • Marital status:      Spouse name: N/A   • Number of children: N/A   • Years of education: N/A     Social History Main Topics   • Smoking status: Former Smoker     Packs/day: 3.00     Types: Cigarettes     Quit date: 6/1/1981   • Smokeless tobacco: Never Used   • Alcohol use No   • Drug use: No   • Sexual activity: Defer     Other Topics Concern   • None     Social History Narrative           Objective   Physical Exam   Constitutional: He is oriented to person, place, and time. He appears well-developed and well-nourished.   HENT:   Head: Normocephalic and atraumatic.   Right Ear: External ear normal.   Left Ear: External ear normal.   Nose: Nose normal.   Mouth/Throat: Oropharynx is clear and moist.   Eyes: EOM are normal. Pupils are equal, round, and reactive to light. Right eye exhibits no discharge. Left eye exhibits no discharge. No scleral icterus.   Neck: Neck supple. No tracheal deviation present. No thyromegaly present.   Cardiovascular: Normal rate and regular rhythm.    Pulmonary/Chest: Effort normal and breath sounds normal. No respiratory distress. He has no wheezes.   Abdominal: Soft. Bowel sounds are normal. He exhibits no distension. There is no tenderness. There is no rebound and no guarding.   Musculoskeletal: Normal range of motion. He exhibits no edema, tenderness or deformity.   Neurological: He is alert and oriented to person, place, and time.   Skin: Skin is warm. No erythema.   Psychiatric: He has a normal mood and affect. His  behavior is normal. Judgment and thought content normal.   Nursing note and vitals reviewed.      Procedures         ED Course  ED Course                  MDM  Number of Diagnoses or Management Options  Palpitations: new and does not require workup     Amount and/or Complexity of Data Reviewed  Clinical lab tests: ordered and reviewed  Tests in the radiology section of CPT®: ordered and reviewed  Tests in the medicine section of CPT®: reviewed and ordered  Decide to obtain previous medical records or to obtain history from someone other than the patient: yes  Independent visualization of images, tracings, or specimens: yes    Risk of Complications, Morbidity, and/or Mortality  Presenting problems: moderate  Diagnostic procedures: moderate  Management options: moderate    Patient Progress  Patient progress: stable      Final diagnoses:   Palpitations            Vianey Iris Stockton DO  08/28/17 1142

## 2017-08-27 NOTE — ED NOTES
"Patient states he feels a \"flutter\" in chest; Patient noted to have occasional PVC's on the monitor; Dr Stockton at bedside; skin pink, warm and dry; no other complaints from patient; will continue to monitor patient for any changes.     Cynthia Coon RN  08/27/17 8999    "

## 2017-08-28 ENCOUNTER — PATIENT OUTREACH (OUTPATIENT)
Dept: CASE MANAGEMENT | Facility: OTHER | Age: 77
End: 2017-08-28

## 2017-08-29 ENCOUNTER — HOSPITAL ENCOUNTER (OUTPATIENT)
Dept: PHYSICAL THERAPY | Facility: HOSPITAL | Age: 77
Setting detail: THERAPIES SERIES
Discharge: HOME | End: 2017-08-29

## 2017-08-29 DIAGNOSIS — M54.50 LUMBAR PAIN WITH RADIATION DOWN RIGHT LEG: Primary | ICD-10-CM

## 2017-08-29 DIAGNOSIS — G89.29 CHRONIC PAIN OF RIGHT KNEE: ICD-10-CM

## 2017-08-29 DIAGNOSIS — M79.604 LUMBAR PAIN WITH RADIATION DOWN RIGHT LEG: Primary | ICD-10-CM

## 2017-08-29 DIAGNOSIS — M25.561 CHRONIC PAIN OF RIGHT KNEE: ICD-10-CM

## 2017-08-29 LAB — BACTERIA SPEC AEROBE CULT: NORMAL

## 2017-08-29 PROCEDURE — 97163 PT EVAL HIGH COMPLEX 45 MIN: CPT

## 2017-08-29 NOTE — THERAPY TREATMENT NOTE
Outpatient Physical Therapy Ortho Initial Evaluation  MICKY Lacey     Patient Name: Leodan Posadas  : 1940  MRN: [id number]  Today's Date: 2017      Visit Date: 2017    Patient Active Problem List   Diagnosis   • CAD (coronary artery disease), native coronary artery   • Hyperlipidemia   • Hypertension   • PAC (premature atrial contraction)   • Medication intolerance   • Status post coronary artery stent placement   • Chest pain with moderate risk for cardiac etiology        Past Medical History:   Diagnosis Date   • Arthritis    • CAD (coronary artery disease)     s/p CABG with occluded grafts   • Carpal tunnel syndrome on both sides    • Hyperlipidemia    • Hypertension         Past Surgical History:   Procedure Laterality Date   • CARDIAC CATHETERIZATION     • CORONARY ARTERY BYPASS GRAFT     • HERNIA REPAIR     • KNEE ARTHROSCOPY     • PERIPHERAL ARTERIAL STENT GRAFT     • SHOULDER SURGERY Right    • SINUS SURGERY         Visit Dx:     ICD-10-CM ICD-9-CM   1. Lumbar pain with radiation down right leg M54.5 724.2   2. Chronic pain of right knee M25.561 719.46    G89.29 338.29             Patient History       17 0900          History    Chief Complaint Difficulty Walking;Pain;Joint stiffness;Difficulty with daily activities  -RT      Type of Pain Back pain;Lower Extremity / Leg;Knee pain   right  -RT      Date Current Problem(s) Began --   Summer of 2004  -RT      Brief Description of Current Complaint Pt suffered an injury at [employer] in the Summer of 2004.  The patient has suffered from chronic pain for over the past ten years in his right knee and back.  The pain has intensified in the past year and the patient recently received injections in his right knee.  The patient has been referred back to therapy for improved mobility.   -RT      Patient/Caregiver Goals Relieve pain;Return to prior level of function;Improve mobility;Improve strength  -RT      Current Tobacco Use no  -RT       Smoking Status no  -RT      Patient's Rating of General Health Good  -RT      Hand Dominance right-handed  -RT      Patient seeing anyone else for problem(s)? No  -RT      How has patient tried to help current problem? meds, rest, heat  -RT      What clinical tests have you had for this problem? X-ray  -RT      Results of Clinical Tests right knee OA  -RT      Pain     Pain Location Back;Knee   right  -RT      Pain at Present 6  -RT      Pain at Best 6  -RT      Pain at Worst 10  -RT      Pain Frequency Constant/continuous  -RT      Pain Description Aching;Stabbing  -RT      What Performance Factors Make the Current Problem(s) WORSE? bending, lifting, squatting, standing  -RT      Tolerance Time- Standing 5min  -RT      Fall Risk Assessment    Any falls in the past year: Yes  -RT      Number of falls reported in the last 12 months --   3  -RT      Factors that contributed to the fall: Tripped  -RT      Daily Activities    Primary Language English  -RT      How does patient learn best? Reading;Listening;Demonstration;Pictures/Video  -RT      Safety    Are you being hurt, hit, or frightened by anyone at home or in your life? No  -RT      Are you being neglected by a caregiver No  -RT        User Key  (r) = Recorded By, (t) = Taken By, (c) = Cosigned By    Initials Name Provider Type    RT Giuliano Arora PT Physical Therapist                PT Ortho       08/29/17 0900    Posture/Observations    Posture/Observations Comments fwd flexed posture; decreased stance on right LE  -RT    Sensory Screen for Light Touch- Lower Quarter Clearing    L1 (inguinal area) Right:;Diminished;Left:;Intact  -RT    L2 (anterior mid thigh) Right:;Diminished;Left:;Intact  -RT    L3 (distal anterior thigh) Right:;Diminished;Left:;Intact  -RT    L4 (medial lower leg/foot) Right:;Diminished;Left:;Intact  -RT    L5 (lateral lower leg/great toe) Bilateral:;Intact  -RT    S1 (bottom of foot) Bilateral:;Intact  -RT    Myotomal Screen- Lower  Quarter Clearing    Hip flexion (L2) Right:;3+ (Fair +);Left:;4+ (Good +)  -RT    Knee extension (L3) Right:;3 (Fair);Left:;4+ (Good +)  -RT    Knee flexion (S2) Right:;3 (Fair);Left:;4+ (Good +)  -RT    Lumbar ROM Screen- Lower Quarter Clearing    Lumbar Flexion Impaired   50%  -RT    Lumbar Extension Impaired   25%  -RT    Lumbar Rotation Impaired   bilat 50%  -RT    Lumbosacral Palpation    Spinous Process Tender   L5-S1  -RT    Knee Palpation    Patella Tendon Right:;Tender  -RT    Medial Joint Line Right:;Tender  -RT    Knee Special Tests    Anterior drawer (ACL lesion) Unable to Test  -RT    Posterior drawer (PCL lesion) Unable to Test  -RT    Valgus stress (MCL lesion) Unable to Test  -RT    Varus stress (LCL lesion) Unable to Test  -RT    Thessaly test (meniscal lesion) Unable to Test  -RT    ROM (Range of Motion)    General ROM Detail left knee 0-124; right knee 25-95  -RT      User Key  (r) = Recorded By, (t) = Taken By, (c) = Cosigned By    Initials Name Provider Type    RT Giuliano Arora PT Physical Therapist                            Therapy Education       08/29/17 1242          Therapy Education    Given HEP;Pain management;Symptoms/condition management;Posture/body mechanics  -RT      Program New  -RT      How Provided Verbal;Demonstration;Written  -RT      Provided to Patient  -RT      Level of Understanding Teach back education performed;Verbalized;Demonstrated  -RT        User Key  (r) = Recorded By, (t) = Taken By, (c) = Cosigned By    Initials Name Provider Type    RT Giuliano Arora PT Physical Therapist                PT OP Goals       08/29/17 1200       PT Short Term Goals    STG Date to Achieve 09/12/17  -RT     STG 1 Pt will be instructed in a HEP.  -RT     STG 1 Progress New  -RT     STG 2 Pt will improve right LE strength to grossly 4-/5.  -RT     STG 2 Progress New  -RT     Long Term Goals    LTG Date to Achieve 09/26/17  -RT     LTG 1 Pt will report pain no greater than 4/10.  -RT      LTG 1 Progress New  -RT     LTG 2 Pt will be able to stand for 20 min without increased pain.  -RT     LTG 2 Progress New  -RT     LTG 3 Pt will improved right knee rom to - degrees.  -RT     LTG 3 Progress New  -RT     Time Calculation    PT Goal Re-Cert Due Date 09/26/17  -RT       User Key  (r) = Recorded By, (t) = Taken By, (c) = Cosigned By    Initials Name Provider Type    RT Giuliano Arora, PT Physical Therapist                PT Assessment/Plan       08/29/17 1243       PT Assessment    Functional Limitations Performance in leisure activities;Performance in self-care ADL;Limitations in functional capacity and performance  -RT     Impairments Pain;Balance;Gait;Range of motion;Poor body mechanics;Muscle strength;Sensation  -RT     Assessment Comments Pt is a 78 y/o male referred to therapy for treatment of back and right knee pain.  Pt presents with decreased right knee rom, decreased right LE strength and increased pain.  Pt will benefit from therapy services for improved mobility and function.  -RT     Rehab Potential Good  -RT     Patient/caregiver participated in establishment of treatment plan and goals Yes  -RT     Patient would benefit from skilled therapy intervention Yes  -RT     PT Plan    PT Frequency 2x/week  -RT     Predicted Duration of Therapy Intervention (days/wks) 6 weeks  -RT     Planned CPT's? PT EVAL HIGH COMPLEXITY: 80761;PT RE-EVAL: 99924;PT THER PROC EA 15 MIN: 78747;PT MANUAL THERAPY EA 15 MIN: 65968;PT NEUROMUSC RE-EDUCATION EA 15 MIN: 65466;PT GAIT TRAINING EA 15 MIN: 77347;PT ELECTRICAL STIM UNATTEND: ;PT ULTRASOUND EA 15 MIN: 39459;PT TRACTION LUMBAR: 88906;PT IONTOPHORESIS EA 15 MIN: 34348  -RT     Physical Therapy Interventions (Optional Details) balance training;gait training;home exercise program;joint mobilization;postural re-education;patient/family education;neuromuscular re-education;modalities;manual therapy techniques;lumbar stabilization;ROM (Range of  Motion);stretching;strengthening  -RT     PT Plan Comments Will follow for optimal gains.  -RT       User Key  (r) = Recorded By, (t) = Taken By, (c) = Cosigned By    Initials Name Provider Type    RT Giuliano Arora, PT Physical Therapist                                    Time Calculation:   Start Time: 0905  Stop Time: 1000  Time Calculation (min): 55 min     Therapy Charges for Today     Code Description Service Date Service Provider Modifiers Qty    63078423041 HC PT EVAL HIGH COMPLEXITY 4 8/29/2017 Giuliano Arora PT GP 1                    Giuliano Arora PT  8/29/2017

## 2017-09-05 ENCOUNTER — HOSPITAL ENCOUNTER (OUTPATIENT)
Dept: PHYSICAL THERAPY | Facility: HOSPITAL | Age: 77
Setting detail: THERAPIES SERIES
Discharge: HOME | End: 2017-09-05

## 2017-09-05 DIAGNOSIS — M25.561 CHRONIC PAIN OF RIGHT KNEE: ICD-10-CM

## 2017-09-05 DIAGNOSIS — M79.604 LUMBAR PAIN WITH RADIATION DOWN RIGHT LEG: Primary | ICD-10-CM

## 2017-09-05 DIAGNOSIS — M54.50 LUMBAR PAIN WITH RADIATION DOWN RIGHT LEG: Primary | ICD-10-CM

## 2017-09-05 DIAGNOSIS — G89.29 CHRONIC PAIN OF RIGHT KNEE: ICD-10-CM

## 2017-09-05 PROCEDURE — 97010 HOT OR COLD PACKS THERAPY: CPT | Performed by: PHYSICAL THERAPIST

## 2017-09-05 PROCEDURE — 97110 THERAPEUTIC EXERCISES: CPT | Performed by: PHYSICAL THERAPIST

## 2017-09-05 PROCEDURE — G0283 ELEC STIM OTHER THAN WOUND: HCPCS | Performed by: PHYSICAL THERAPIST

## 2017-09-05 PROCEDURE — 97140 MANUAL THERAPY 1/> REGIONS: CPT | Performed by: PHYSICAL THERAPIST

## 2017-09-06 ENCOUNTER — TRANSCRIBE ORDERS (OUTPATIENT)
Dept: PHYSICAL THERAPY | Facility: HOSPITAL | Age: 77
End: 2017-09-06

## 2017-09-06 DIAGNOSIS — M54.9 BACK PAIN, UNSPECIFIED BACK LOCATION, UNSPECIFIED BACK PAIN LATERALITY, UNSPECIFIED CHRONICITY: ICD-10-CM

## 2017-09-06 DIAGNOSIS — M25.561 RIGHT KNEE PAIN, UNSPECIFIED CHRONICITY: Primary | ICD-10-CM

## 2017-09-07 ENCOUNTER — HOSPITAL ENCOUNTER (OUTPATIENT)
Dept: PHYSICAL THERAPY | Facility: HOSPITAL | Age: 77
Setting detail: THERAPIES SERIES
Discharge: HOME | End: 2017-09-07

## 2017-09-07 DIAGNOSIS — G89.29 CHRONIC PAIN OF RIGHT KNEE: ICD-10-CM

## 2017-09-07 DIAGNOSIS — M79.604 LUMBAR PAIN WITH RADIATION DOWN RIGHT LEG: Primary | ICD-10-CM

## 2017-09-07 DIAGNOSIS — M54.50 LUMBAR PAIN WITH RADIATION DOWN RIGHT LEG: Primary | ICD-10-CM

## 2017-09-07 DIAGNOSIS — M25.561 CHRONIC PAIN OF RIGHT KNEE: ICD-10-CM

## 2017-09-07 PROCEDURE — 97110 THERAPEUTIC EXERCISES: CPT

## 2017-09-07 PROCEDURE — 97140 MANUAL THERAPY 1/> REGIONS: CPT

## 2017-09-07 PROCEDURE — 97010 HOT OR COLD PACKS THERAPY: CPT

## 2017-09-07 PROCEDURE — G0283 ELEC STIM OTHER THAN WOUND: HCPCS

## 2017-09-07 NOTE — THERAPY TREATMENT NOTE
Outpatient Physical Therapy Ortho Treatment Note  MICKY Lacey     Patient Name: Leodan Posadas  : 1940  MRN: [id number]  Today's Date: 2017      Visit Date: 2017    Visit Dx:    ICD-10-CM ICD-9-CM   1. Lumbar pain with radiation down right leg M54.5 724.2   2. Chronic pain of right knee M25.561 719.46    G89.29 338.29       Patient Active Problem List   Diagnosis   • CAD (coronary artery disease), native coronary artery   • Hyperlipidemia   • Hypertension   • PAC (premature atrial contraction)   • Medication intolerance   • Status post coronary artery stent placement   • Chest pain with moderate risk for cardiac etiology        Past Medical History:   Diagnosis Date   • Arthritis    • CAD (coronary artery disease)     s/p CABG with occluded grafts   • Carpal tunnel syndrome on both sides    • Hyperlipidemia    • Hypertension         Past Surgical History:   Procedure Laterality Date   • CARDIAC CATHETERIZATION     • CORONARY ARTERY BYPASS GRAFT     • HERNIA REPAIR     • KNEE ARTHROSCOPY     • PERIPHERAL ARTERIAL STENT GRAFT     • SHOULDER SURGERY Right    • SINUS SURGERY               PT Ortho       17 1500    Subjective Comments    Subjective Comments Patient reports 5-6/10 low back and R) knee pain prior to tx.  Pt states he continues to experience numbness and tingling that goes from his low back into his R) knee.  Pt reports difficulty sleeping at night due to the pain.    -KATALINA    Subjective Pain    Able to rate subjective pain? yes  -KATALINA    Pre-Treatment Pain Level 6   5-6/10  -KATALINA    Post-Treatment Pain Level 4  -KATALINA      17 1200    Subjective Comments    Subjective Comments Patient reports that he is experiencing 6/10 pain today.  He notes that ESTIM helps to provide some relief.  -BE    Subjective Pain    Able to rate subjective pain? yes  -BE    Pre-Treatment Pain Level 6  -BE    Post-Treatment Pain Level 5  -BE      User Key  (r) = Recorded By, (t) = Taken By, (c) = Cosigned By     Initials Name Provider Type    KATALINA Hogan PTA Physical Therapy Assistant    DIONE Wei, PT Physical Therapist                            PT Assessment/Plan       09/07/17 1618       PT Assessment    Assessment Comments Patient arrives to therapy today w/ continued reports of low back and R) LE radicular pain.  Supervising PT, Giuliano Arora educated pt to perform activities to his tolerance, and notify therapy w/ any worsening changes.  Pt verbalized understanding and demonstrated appropriately.  PTA initiated tx w/ MH and Estim to low back and R) knee for pain control f/b therex as per written flow sheet.  Treatment concluded with manual therapy techniques to assist w/ pain control and improve mobility.  Pt noted w/ decreased pain at conclusion of session, 4/10.  Pt continues to progress w/ therapy as tolerated.  No adverse reactions observed following modalities.   -KATALINA     PT Plan    PT Plan Comments Continue with PT's POC and progress tx as tolerated by patient.   -KATALINA       User Key  (r) = Recorded By, (t) = Taken By, (c) = Cosigned By    Initials Name Provider Type    KATALINA Hogan PTA Physical Therapy Assistant                Modalities       09/07/17 1500          Moist Heat    MH Applied Yes  -KATALINA      Location right knee and lumbar  -KATALINA      Rx Minutes 15 mins  -KATALINA      MH Prior to Rx Yes   w/ estim in supine position  -KATALINA      ELECTRICAL STIMULATION    Attended/Unattended Unattended   no skin irriation noted following estim  -KATALINA      Stimulation Type IFC  -KATALINA      Max mAmp --   as to pt's tolerance  -KATALINA      Location/Electrode Placement/Other lumbar region  -KATALINA      Rx Minutes 15 mins  -KATALINA        User Key  (r) = Recorded By, (t) = Taken By, (c) = Cosigned By    Initials Name Provider Type    KATALINA Hogan PTA Physical Therapy Assistant                Exercises       09/07/17 1500          Subjective Comments    Subjective Comments Patient reports 5-6/10 low  "back and R) knee pain prior to tx.  Pt states he continues to experience numbness and tingling that goes from his low back into his R) knee.  Pt reports difficulty sleeping at night due to the pain.    -KATALINA      Subjective Pain    Able to rate subjective pain? yes  -KATALINA      Pre-Treatment Pain Level 6   5-6/10  -KATALINA      Post-Treatment Pain Level 4  -KATALINA      Exercise 1    Exercise Name 1 QS 10x2, heel slides 10 x 2, LTR 10x2, SKTC 3x20\", scap squeeze 10x2, PPT 10x2  -KATALINA      Cueing 1 Tactile;Verbal;Demo  -KATALINA      Time (Minutes) 1 25 min  -KATALINA        User Key  (r) = Recorded By, (t) = Taken By, (c) = Cosigned By    Initials Name Provider Type    KATALINA Hogan PTA Physical Therapy Assistant                        Manual Rx (last 36 hours)      Manual Treatments       09/07/17 1500          Manual Rx 1    Manual Rx 1 Location Lumbar   pt L) sidelying w/ pillow between knees  -KATALINA      Manual Rx 1 Type Soft tissue mobilization  -KATALINA      Manual Rx 1 Grade as to pt's tolerance  -KATALINA      Manual Rx 1 Duration 15 min  -KATALINA        User Key  (r) = Recorded By, (t) = Taken By, (c) = Cosigned By    Initials Name Provider Type    KATALINA Hogan PTA Physical Therapy Assistant                    Therapy Education       09/07/17 1618          Therapy Education    Given HEP;Symptoms/condition management;Pain management;Posture/body mechanics  -KATALINA      Program Reinforced  -KATALINA      How Provided Verbal;Demonstration  -KATALINA      Provided to Patient  -KATALINA      Level of Understanding Verbalized;Demonstrated  -KATALINA        User Key  (r) = Recorded By, (t) = Taken By, (c) = Cosigned By    Initials Name Provider Type    KATALINA Hogan PTA Physical Therapy Assistant                Time Calculation:   Start Time: 1500  Stop Time: 1603  Time Calculation (min): 63 min    Therapy Charges for Today     Code Description Service Date Service Provider Modifiers Qty    90111639297  PT THER PROC EA 15 MIN 9/7/2017 Estelle Talavera" Edison, PTA GP 2    73565962450 HC PT HOT/COLD PACK WC NONMCARE 9/7/2017 Estelle Hogan, PTA GP 1    75315108399 HC PT ELECTRICAL STIM UNATTENDED 9/7/2017 Estelle Hogan, PTA  1    32733520082 HC PT MANUAL THERAPY EA 15 MIN 9/7/2017 Estelle Hogan, PTA GP 1                    Estelle Talavera. Edison, MARCELINO  9/7/2017

## 2017-09-12 ENCOUNTER — HOSPITAL ENCOUNTER (OUTPATIENT)
Dept: PHYSICAL THERAPY | Facility: HOSPITAL | Age: 77
Setting detail: THERAPIES SERIES
Discharge: HOME | End: 2017-09-12

## 2017-09-12 DIAGNOSIS — G89.29 CHRONIC PAIN OF RIGHT KNEE: ICD-10-CM

## 2017-09-12 DIAGNOSIS — M25.561 CHRONIC PAIN OF RIGHT KNEE: ICD-10-CM

## 2017-09-12 DIAGNOSIS — M79.604 LUMBAR PAIN WITH RADIATION DOWN RIGHT LEG: Primary | ICD-10-CM

## 2017-09-12 DIAGNOSIS — M54.50 LUMBAR PAIN WITH RADIATION DOWN RIGHT LEG: Primary | ICD-10-CM

## 2017-09-12 PROCEDURE — G0283 ELEC STIM OTHER THAN WOUND: HCPCS

## 2017-09-12 PROCEDURE — 97110 THERAPEUTIC EXERCISES: CPT

## 2017-09-12 PROCEDURE — 97010 HOT OR COLD PACKS THERAPY: CPT

## 2017-09-12 NOTE — THERAPY TREATMENT NOTE
Outpatient Physical Therapy Ortho Treatment Note  MICKY Lacey     Patient Name: Leodan Posadas  : 1940  MRN: [id number]  Today's Date: 2017      Visit Date: 2017    Visit Dx:    ICD-10-CM ICD-9-CM   1. Lumbar pain with radiation down right leg M54.5 724.2   2. Chronic pain of right knee M25.561 719.46    G89.29 338.29       Patient Active Problem List   Diagnosis   • CAD (coronary artery disease), native coronary artery   • Hyperlipidemia   • Hypertension   • PAC (premature atrial contraction)   • Medication intolerance   • Status post coronary artery stent placement   • Chest pain with moderate risk for cardiac etiology        Past Medical History:   Diagnosis Date   • Arthritis    • CAD (coronary artery disease)     s/p CABG with occluded grafts   • Carpal tunnel syndrome on both sides    • Hyperlipidemia    • Hypertension         Past Surgical History:   Procedure Laterality Date   • CARDIAC CATHETERIZATION     • CORONARY ARTERY BYPASS GRAFT     • HERNIA REPAIR     • KNEE ARTHROSCOPY     • PERIPHERAL ARTERIAL STENT GRAFT     • SHOULDER SURGERY Right    • SINUS SURGERY                               PT Assessment/Plan       17 1031       PT Assessment    Assessment Comments Tx consisted of mh and estim to back and right knee followed by ther ex.  Pt requested to abbreviate session and reports his pain has decreased.  Pt cont to demonstrate an antalgic gait with decreased stance on right leg.  Pt reported decreased pain following session.  -RT     PT Plan    PT Plan Comments Will follow progressing balance and gait.  -RT       User Key  (r) = Recorded By, (t) = Taken By, (c) = Cosigned By    Initials Name Provider Type    RT Giuliano Arora, PT Physical Therapist                Modalities       17 0900          Subjective Comments    Subjective Comments Patient requested to abbreviate session today.  Pt reports haveing less pain today.  -RT      Subjective Pain    Able to rate subjective  pain? yes  -RT      Pre-Treatment Pain Level 5  -RT      Post-Treatment Pain Level 4  -RT      Moist Heat    MH Applied Yes  -RT      Location right knee and lumbar  -RT      Rx Minutes 15 mins  -RT      MH Prior to Rx Yes  -RT      ELECTRICAL STIMULATION    Attended/Unattended Unattended  -RT      Stimulation Type IFC  -RT      Location/Electrode Placement/Other lumbar region  -RT      Rx Minutes 15 mins  -RT        User Key  (r) = Recorded By, (t) = Taken By, (c) = Cosigned By    Initials Name Provider Type    RT Giuliano Arora PT Physical Therapist                Exercises       09/12/17 0900          Subjective Comments    Subjective Comments Patient requested to abbreviate session today.  Pt reports haveing less pain today.  -RT      Subjective Pain    Able to rate subjective pain? yes  -RT      Pre-Treatment Pain Level 5  -RT      Post-Treatment Pain Level 4  -RT      Exercise 1    Exercise Name 1 QS 10x2, heel slides 10 x 2, LTR 10x2, scap squeeze 10x2, PPT 10x2  -RT      Cueing 1 Tactile;Verbal;Demo  -RT      Time (Minutes) 1 25 min  -RT        User Key  (r) = Recorded By, (t) = Taken By, (c) = Cosigned By    Initials Name Provider Type    RT Giuliano Arora PT Physical Therapist                                   Therapy Education       09/12/17 1031          Therapy Education    Given HEP;Symptoms/condition management;Pain management;Posture/body mechanics  -RT      Program Reinforced  -RT      How Provided Verbal;Demonstration  -RT      Provided to Patient  -RT      Level of Understanding Verbalized;Demonstrated  -RT        User Key  (r) = Recorded By, (t) = Taken By, (c) = Cosigned By    Initials Name Provider Type    RT Giuliano Arora PT Physical Therapist                Time Calculation:   Start Time: 0905  Stop Time: 0945  Time Calculation (min): 40 min    Therapy Charges for Today     Code Description Service Date Service Provider Modifiers Qty    79482999527  PT THER PROC EA 15 MIN 9/12/2017  Giuliano Arora, PT GP 2    32004327621 HC PT HOT/COLD PACK WC NONMCARE 9/12/2017 Giuliano Arora, PT GP 1    04332289817 HC PT ELECTRICAL STIM UNATTENDED 9/12/2017 Giuliano Arora, PT  1                    Giuliano Arora, PT  9/12/2017

## 2017-09-14 ENCOUNTER — HOSPITAL ENCOUNTER (OUTPATIENT)
Dept: PHYSICAL THERAPY | Facility: HOSPITAL | Age: 77
Setting detail: THERAPIES SERIES
Discharge: HOME | End: 2017-09-14

## 2017-09-14 DIAGNOSIS — M54.50 LUMBAR PAIN WITH RADIATION DOWN RIGHT LEG: Primary | ICD-10-CM

## 2017-09-14 DIAGNOSIS — M79.604 LUMBAR PAIN WITH RADIATION DOWN RIGHT LEG: Primary | ICD-10-CM

## 2017-09-14 DIAGNOSIS — G89.29 CHRONIC PAIN OF RIGHT KNEE: ICD-10-CM

## 2017-09-14 DIAGNOSIS — M25.561 CHRONIC PAIN OF RIGHT KNEE: ICD-10-CM

## 2017-09-14 PROCEDURE — 97110 THERAPEUTIC EXERCISES: CPT | Performed by: PHYSICAL THERAPIST

## 2017-09-14 PROCEDURE — G0283 ELEC STIM OTHER THAN WOUND: HCPCS | Performed by: PHYSICAL THERAPIST

## 2017-09-14 NOTE — PROGRESS NOTES
Outpatient Physical Therapy Ortho Treatment Note  MICKY Lacey     Patient Name: Leodan Posadas  : 1940  MRN: [id number]  Today's Date: 2017      Visit Date: 2017    Visit Dx:    ICD-10-CM ICD-9-CM   1. Lumbar pain with radiation down right leg M54.5 724.2   2. Chronic pain of right knee M25.561 719.46    G89.29 338.29       Patient Active Problem List   Diagnosis   • CAD (coronary artery disease), native coronary artery   • Hyperlipidemia   • Hypertension   • PAC (premature atrial contraction)   • Medication intolerance   • Status post coronary artery stent placement   • Chest pain with moderate risk for cardiac etiology        Past Medical History:   Diagnosis Date   • Arthritis    • CAD (coronary artery disease)     s/p CABG with occluded grafts   • Carpal tunnel syndrome on both sides    • Hyperlipidemia    • Hypertension         Past Surgical History:   Procedure Laterality Date   • CARDIAC CATHETERIZATION     • CORONARY ARTERY BYPASS GRAFT     • HERNIA REPAIR     • KNEE ARTHROSCOPY     • PERIPHERAL ARTERIAL STENT GRAFT     • SHOULDER SURGERY Right    • SINUS SURGERY               PT Ortho       17 1400    Subjective Comments    Subjective Comments Patient reported low back and right knee pain prior to the treatment session.  -AD    Subjective Pain    Able to rate subjective pain? yes  -AD    Pre-Treatment Pain Level 6  -AD      User Key  (r) = Recorded By, (t) = Taken By, (c) = Cosigned By    Initials Name Provider Type    AD Ashley Claudene Dalton, PT Physical Therapist                            PT Assessment/Plan       17 9723       PT Assessment    Assessment Comments Today's session began with moist heat on the low back and left knee, combined with premod ESTIM on the right lumbar region. No redness was observed following modalities. Therapeutic exercises were performed to address lumbar and right knee range of motion, strength, and gait. He reported decreased pain following  the session. The patient declined STM to the low back. He will be progressed as tolerated.  -AD     PT Plan    PT Plan Comments Progress as tolerated per POC.  -AD       User Key  (r) = Recorded By, (t) = Taken By, (c) = Cosigned By    Initials Name Provider Type    AD Ashley Claudene Dalton, PT Physical Therapist                Modalities       09/14/17 1400          Moist Heat    MH Applied Yes  -AD      Location right knee and lumbar  -AD      Rx Minutes 15 mins  -AD      MH Prior to Rx Yes   No redness observed  -AD      ELECTRICAL STIMULATION    Attended/Unattended Unattended   With moist heat, no redness observed  -AD      Stimulation Type IFC  -AD      Location/Electrode Placement/Other lumbar region  -AD      Rx Minutes 15 mins  -AD        User Key  (r) = Recorded By, (t) = Taken By, (c) = Cosigned By    Initials Name Provider Type    AD Ashley Claudene Dalton, PT Physical Therapist                Exercises       09/14/17 1400          Subjective Comments    Subjective Comments Patient reported low back and right knee pain prior to the treatment session.  -AD      Subjective Pain    Able to rate subjective pain? yes  -AD      Pre-Treatment Pain Level 6  -AD      Exercise 1    Exercise Name 1 SAQ, QS, HS, LTR, SKTC  -AD      Cueing 1 Verbal;Tactile  -AD      Sets 1 2  -AD      Reps 1 20  -AD      Time (Minutes) 1 25 minutes  -AD        User Key  (r) = Recorded By, (t) = Taken By, (c) = Cosigned By    Initials Name Provider Type    AD Ashley Claudene Dalton, NERISSA Physical Therapist                                   Therapy Education       09/14/17 1500          Therapy Education    Given HEP;Symptoms/condition management;Pain management;Posture/body mechanics  -AD      Program Reinforced  -AD      How Provided Verbal;Demonstration  -AD      Provided to Patient  -AD      Level of Understanding Verbalized;Demonstrated  -AD        User Key  (r) = Recorded By, (t) = Taken By, (c) = Cosigned By    Initials Name  Provider Type    AD Ashley Claudene Dalton, PT Physical Therapist                Time Calculation:   Start Time: 1450  Stop Time: 1533  Time Calculation (min): 43 min    Therapy Charges for Today     Code Description Service Date Service Provider Modifiers Qty    86230584089 HC PT ELECTRICAL STIM UNATTENDED 9/14/2017 Ashley Claudene Dalton, PT  1    33703930388 HC PT THER PROC EA 15 MIN 9/14/2017 Ashley Claudene Dalton, PT GP 2                    Ashley Claudene Dalton, PT  9/14/2017

## 2017-09-18 ENCOUNTER — HOSPITAL ENCOUNTER (OUTPATIENT)
Dept: PHYSICAL THERAPY | Facility: HOSPITAL | Age: 77
Setting detail: THERAPIES SERIES
Discharge: HOME | End: 2017-09-18

## 2017-09-18 DIAGNOSIS — M54.50 LUMBAR PAIN WITH RADIATION DOWN RIGHT LEG: Primary | ICD-10-CM

## 2017-09-18 DIAGNOSIS — M25.561 CHRONIC PAIN OF RIGHT KNEE: ICD-10-CM

## 2017-09-18 DIAGNOSIS — M79.604 LUMBAR PAIN WITH RADIATION DOWN RIGHT LEG: Primary | ICD-10-CM

## 2017-09-18 DIAGNOSIS — G89.29 CHRONIC PAIN OF RIGHT KNEE: ICD-10-CM

## 2017-09-18 PROCEDURE — 97140 MANUAL THERAPY 1/> REGIONS: CPT

## 2017-09-18 PROCEDURE — 97110 THERAPEUTIC EXERCISES: CPT

## 2017-09-18 PROCEDURE — G0283 ELEC STIM OTHER THAN WOUND: HCPCS

## 2017-09-18 NOTE — THERAPY TREATMENT NOTE
Outpatient Physical Therapy Ortho Treatment Note  MICKY Lacey     Patient Name: Leodan Posadas  : 1940  MRN: [id number]  Today's Date: 2017      Visit Date: 2017    Visit Dx:    ICD-10-CM ICD-9-CM   1. Lumbar pain with radiation down right leg M54.5 724.2   2. Chronic pain of right knee M25.561 719.46    G89.29 338.29       Patient Active Problem List   Diagnosis   • CAD (coronary artery disease), native coronary artery   • Hyperlipidemia   • Hypertension   • PAC (premature atrial contraction)   • Medication intolerance   • Status post coronary artery stent placement   • Chest pain with moderate risk for cardiac etiology        Past Medical History:   Diagnosis Date   • Arthritis    • CAD (coronary artery disease)     s/p CABG with occluded grafts   • Carpal tunnel syndrome on both sides    • Hyperlipidemia    • Hypertension         Past Surgical History:   Procedure Laterality Date   • CARDIAC CATHETERIZATION     • CORONARY ARTERY BYPASS GRAFT     • HERNIA REPAIR     • KNEE ARTHROSCOPY     • PERIPHERAL ARTERIAL STENT GRAFT     • SHOULDER SURGERY Right    • SINUS SURGERY               PT Ortho       17 1600    Subjective Comments    Subjective Comments Patient reports continued low back pain and R) leg pain.  Pt reports 6/10 pain prior to session.   -KATALINA    Subjective Pain    Able to rate subjective pain? yes  -KATALINA    Pre-Treatment Pain Level 6  -KATALINA    Post-Treatment Pain Level 5  -KATALINA      User Key  (r) = Recorded By, (t) = Taken By, (c) = Cosigned By    Initials Name Provider Type    KATALINA Hogan PTA Physical Therapy Assistant                            PT Assessment/Plan       17 1629       PT Assessment    Assessment Comments Patient tolerated treatment well today w/ reports of decreased pain at conclusion of session, 4/10.  Today's session initiated w/ MH and Estim to low back and R) knee for pain control f/b therex as per written flow sheet and manual rx.  Pt received  "cues throughout session for improved feedback and for max benefit w/activities.  Pt continues to progress with therapy as tolerated to address goals and acheive maximum level of function.  No adverse reactions observed following modalities.   -KATALINA     PT Plan    PT Plan Comments Continue with PT's POC and progress tx as tolerated by patient.   -KATALINA       User Key  (r) = Recorded By, (t) = Taken By, (c) = Cosigned By    Initials Name Provider Type    KATALINA Hogan PTA Physical Therapy Assistant                Modalities       09/18/17 1600          Moist Heat    MH Applied Yes  -KATALINA      Location right knee and lumbar  -KATALINA      Rx Minutes 15 mins  -KATALINA      MH Prior to Rx Yes   w/ estim to lumbar region in seated position  -KATALINA      ELECTRICAL STIMULATION    Attended/Unattended Unattended   no adverse reaction observed following modality  -KATALINA      Stimulation Type IFC  -KATALINA      Max mAmp --   as to pt's tolerance  -KATALINA      Location/Electrode Placement/Other lumbar region  -KATALINA      Rx Minutes 15 mins  -KATALINA        User Key  (r) = Recorded By, (t) = Taken By, (c) = Cosigned By    Initials Name Provider Type    KATALINA Hogan PTA Physical Therapy Assistant                Exercises       09/18/17 1600          Subjective Comments    Subjective Comments Patient reports continued low back pain and R) leg pain.  Pt reports 6/10 pain prior to session.   -KATALINA      Subjective Pain    Able to rate subjective pain? yes  -KATALINA      Pre-Treatment Pain Level 6  -KATALINA      Post-Treatment Pain Level 5  -KATALINA      Exercise 1    Exercise Name 1 LTR 15x2, PPT 15x2, SKTC 3x20\", SAQ 15x2, QS 10x2  -KATALINA      Cueing 1 Verbal;Tactile;Demo  -KATALINA      Time (Minutes) 1 25 minutes  -KATALINA        User Key  (r) = Recorded By, (t) = Taken By, (c) = Cosigned By    Initials Name Provider Type    KATALINA Hogan PTA Physical Therapy Assistant                        Manual Rx (last 36 hours)      Manual Treatments       09/18/17 1500       "    Manual Rx 1    Manual Rx 1 Location Lumbar   pt L) sidelying w/ pillow between knees  -KATALINA      Manual Rx 1 Type Soft tissue mobilization  -KATALINA      Manual Rx 1 Grade as to pt's tolerance  -KATALINA      Manual Rx 1 Duration 15 min  -KATALINA        User Key  (r) = Recorded By, (t) = Taken By, (c) = Cosigned By    Initials Name Provider Type    KATALINA Estelle Hogan PTA Physical Therapy Assistant                        Time Calculation:   Start Time: 1450  Stop Time: 1555  Time Calculation (min): 65 min    Therapy Charges for Today     Code Description Service Date Service Provider Modifiers Qty    78019661139 HC PT THER PROC EA 15 MIN 9/18/2017 Estelle Hogan, MARCELINO GP 2    10763996824 HC PT MANUAL THERAPY EA 15 MIN 9/18/2017 Estelle Hogan, MARCELINO GP 1    29568704402 HC PT ELECTRICAL STIM UNATTENDED 9/18/2017 Estelle Hogan, MARCELINO  1                    Estelle Hogan PTA  9/18/2017

## 2017-09-21 ENCOUNTER — HOSPITAL ENCOUNTER (OUTPATIENT)
Dept: PHYSICAL THERAPY | Facility: HOSPITAL | Age: 77
Setting detail: THERAPIES SERIES
Discharge: HOME | End: 2017-09-21

## 2017-09-21 DIAGNOSIS — G89.29 CHRONIC PAIN OF RIGHT KNEE: ICD-10-CM

## 2017-09-21 DIAGNOSIS — M79.604 LUMBAR PAIN WITH RADIATION DOWN RIGHT LEG: Primary | ICD-10-CM

## 2017-09-21 DIAGNOSIS — M54.50 LUMBAR PAIN WITH RADIATION DOWN RIGHT LEG: Primary | ICD-10-CM

## 2017-09-21 DIAGNOSIS — M25.561 CHRONIC PAIN OF RIGHT KNEE: ICD-10-CM

## 2017-09-21 PROCEDURE — G0283 ELEC STIM OTHER THAN WOUND: HCPCS

## 2017-09-21 PROCEDURE — 97110 THERAPEUTIC EXERCISES: CPT

## 2017-09-21 PROCEDURE — 97140 MANUAL THERAPY 1/> REGIONS: CPT

## 2017-09-21 PROCEDURE — 97010 HOT OR COLD PACKS THERAPY: CPT

## 2017-09-21 NOTE — THERAPY TREATMENT NOTE
Outpatient Physical Therapy Ortho Treatment Note  MICKY Lacey     Patient Name: Leodan Posadas  : 1940  MRN: [id number]  Today's Date: 2017      Visit Date: 2017    Visit Dx:    ICD-10-CM ICD-9-CM   1. Lumbar pain with radiation down right leg M54.5 724.2   2. Chronic pain of right knee M25.561 719.46    G89.29 338.29       Patient Active Problem List   Diagnosis   • CAD (coronary artery disease), native coronary artery   • Hyperlipidemia   • Hypertension   • PAC (premature atrial contraction)   • Medication intolerance   • Status post coronary artery stent placement   • Chest pain with moderate risk for cardiac etiology        Past Medical History:   Diagnosis Date   • Arthritis    • CAD (coronary artery disease)     s/p CABG with occluded grafts   • Carpal tunnel syndrome on both sides    • Hyperlipidemia    • Hypertension         Past Surgical History:   Procedure Laterality Date   • CARDIAC CATHETERIZATION     • CORONARY ARTERY BYPASS GRAFT     • HERNIA REPAIR     • KNEE ARTHROSCOPY     • PERIPHERAL ARTERIAL STENT GRAFT     • SHOULDER SURGERY Right    • SINUS SURGERY               PT Ortho       17 1600    Subjective Comments    Subjective Comments Patient states he feels physical therapy is helping with his back pain.  Pt reports 3-4/10 back and R) knee pain prior to session.  -KATALINA    Subjective Pain    Able to rate subjective pain? yes  -KATALINA    Pre-Treatment Pain Level 4   3-4/10  -KATALINA    Post-Treatment Pain Level 3  -KATALINA      User Key  (r) = Recorded By, (t) = Taken By, (c) = Cosigned By    Initials Name Provider Type    KATALINA Hogan, PTA Physical Therapy Assistant                            PT Assessment/Plan       17 1614       PT Assessment    Assessment Comments Patient completed today's session w/ slight decrease in pain noted at conclusion of session.  Pt reported he feels his back pain has improved since beginning therapy.  Pt continues to progress with therapy to  address goals and acheive maximum level of function.  Additional LE and postural strengthening activities added to program w/ good response.  No adverse reactions observed following modalities.   -KATALINA     PT Plan    PT Plan Comments Continue with PT's POC and will progress tx as tolerated by patient.   -KATALINA       User Key  (r) = Recorded By, (t) = Taken By, (c) = Cosigned By    Initials Name Provider Type    KATALINA Hogan PTA Physical Therapy Assistant                Modalities       09/21/17 1600          Moist Heat    MH Applied Yes  -KATALINA      Location right knee and lumbar  -KATALINA      Rx Minutes 15 mins  -KATALINA      MH Prior to Rx Yes   w/ estim to low back in supine position  -KATALINA      ELECTRICAL STIMULATION    Attended/Unattended Unattended  -KATALINA      Stimulation Type IFC  -KATALINA      Max mAmp --   as to pt's tolerance  -KATALINA      Location/Electrode Placement/Other lumbar region  -KATALINA      Rx Minutes 15 mins  -KATALINA        User Key  (r) = Recorded By, (t) = Taken By, (c) = Cosigned By    Initials Name Provider Type    KATALINA Hogan PTA Physical Therapy Assistant                Exercises       09/21/17 1600          Subjective Comments    Subjective Comments Patient states he feels physical therapy is helping with his back pain.  Pt reports 3-4/10 back and R) knee pain prior to session.  -KATALINA      Subjective Pain    Able to rate subjective pain? yes  -KATALINA      Pre-Treatment Pain Level 4   3-4/10  -KATALINA      Post-Treatment Pain Level 3  -KATALINA      Exercise 1    Exercise Name 1 LTR 15x2, PPT 15x2, SAQ 15x2, QS 15x2, heel slides x10, ball squeeze 10x2, scap squeeze 10x2  -KATALINA      Cueing 1 Verbal;Tactile;Demo  -KATALINA      Time (Minutes) 1 30 min  -KATALINA        User Key  (r) = Recorded By, (t) = Taken By, (c) = Cosigned By    Initials Name Provider Type    KATALINA Hogan PTA Physical Therapy Assistant                        Manual Rx (last 36 hours)      Manual Treatments       09/21/17 1500          Manual Rx 1     Manual Rx 1 Location Lumbar   pt L) sidelying w/ pillow between knees  -KATALINA      Manual Rx 1 Type Soft tissue mobilization  -KATALINA      Manual Rx 1 Grade as to pt's tolerance  -KATALINA      Manual Rx 1 Duration 15 min  -KATALINA        User Key  (r) = Recorded By, (t) = Taken By, (c) = Cosigned By    Initials Name Provider Type    KATALINA Hogan PTA Physical Therapy Assistant                    Therapy Education       09/21/17 1614          Therapy Education    Given HEP;Symptoms/condition management;Pain management;Posture/body mechanics  -KATALINA      Program Reinforced  -KATALINA      How Provided Verbal;Demonstration  -KATALINA      Provided to Patient  -KATALINA      Level of Understanding Verbalized;Demonstrated  -KATALINA        User Key  (r) = Recorded By, (t) = Taken By, (c) = Cosigned By    Initials Name Provider Type    KATALINA Hogan PTA Physical Therapy Assistant                Time Calculation:   Start Time: 1445  Stop Time: 1553  Time Calculation (min): 68 min    Therapy Charges for Today     Code Description Service Date Service Provider Modifiers Qty    48046904145 HC PT THER PROC EA 15 MIN 9/21/2017 Estelle Hogan PTA GP 2    22015621213 HC PT MANUAL THERAPY EA 15 MIN 9/21/2017 Estelle Hogan PTA GP 1    00798127583 HC PT ELECTRICAL STIM UNATTENDED 9/21/2017 Estelle Hogan PTA  1    56542694483 HC PT HOT/COLD PACK WC NONMCARE 9/21/2017 Estelle Hogan PTA GP 1                    Estelle Hogan PTA  9/21/2017

## 2017-09-26 ENCOUNTER — HOSPITAL ENCOUNTER (OUTPATIENT)
Dept: PHYSICAL THERAPY | Facility: HOSPITAL | Age: 77
Setting detail: THERAPIES SERIES
Discharge: HOME | End: 2017-09-26

## 2017-09-26 DIAGNOSIS — G89.29 CHRONIC PAIN OF RIGHT KNEE: ICD-10-CM

## 2017-09-26 DIAGNOSIS — M25.561 CHRONIC PAIN OF RIGHT KNEE: ICD-10-CM

## 2017-09-26 DIAGNOSIS — M79.604 LUMBAR PAIN WITH RADIATION DOWN RIGHT LEG: Primary | ICD-10-CM

## 2017-09-26 DIAGNOSIS — M54.50 LUMBAR PAIN WITH RADIATION DOWN RIGHT LEG: Primary | ICD-10-CM

## 2017-09-26 PROCEDURE — 97140 MANUAL THERAPY 1/> REGIONS: CPT

## 2017-09-26 PROCEDURE — G0283 ELEC STIM OTHER THAN WOUND: HCPCS

## 2017-09-26 PROCEDURE — 97110 THERAPEUTIC EXERCISES: CPT

## 2017-09-26 PROCEDURE — 97010 HOT OR COLD PACKS THERAPY: CPT

## 2017-09-26 PROCEDURE — G8978 MOBILITY CURRENT STATUS: HCPCS

## 2017-09-26 PROCEDURE — G8979 MOBILITY GOAL STATUS: HCPCS

## 2017-09-26 NOTE — THERAPY RE-EVALUATION
Outpatient Physical Therapy Ortho Re-Evaluation   Abhijit     Patient Name: Leodan Posadas  : 1940  MRN: [id number]  Today's Date: 2017      Visit Date: 2017    Patient Active Problem List   Diagnosis   • CAD (coronary artery disease), native coronary artery   • Hyperlipidemia   • Hypertension   • PAC (premature atrial contraction)   • Medication intolerance   • Status post coronary artery stent placement   • Chest pain with moderate risk for cardiac etiology        Past Medical History:   Diagnosis Date   • Arthritis    • CAD (coronary artery disease)     s/p CABG with occluded grafts   • Carpal tunnel syndrome on both sides    • Hyperlipidemia    • Hypertension         Past Surgical History:   Procedure Laterality Date   • CARDIAC CATHETERIZATION     • CORONARY ARTERY BYPASS GRAFT     • HERNIA REPAIR     • KNEE ARTHROSCOPY     • PERIPHERAL ARTERIAL STENT GRAFT     • SHOULDER SURGERY Right    • SINUS SURGERY         Visit Dx:     ICD-10-CM ICD-9-CM   1. Lumbar pain with radiation down right leg M54.5 724.2   2. Chronic pain of right knee M25.561 719.46    G89.29 338.29                 PT Ortho       17 1700    Myotomal Screen- Lower Quarter Clearing    Hip flexion (L2) Right:;3+ (Fair +);Left:;4+ (Good +)  -RT    Knee extension (L3) Right:;3+ (Fair +);Left:;4+ (Good +)  -RT    Knee flexion (S2) Bilateral:;4- (Good -)  -RT    Lumbar ROM Screen- Lower Quarter Clearing    Lumbar Flexion Impaired   50%  -RT    Lumbar Extension Impaired  -RT      User Key  (r) = Recorded By, (t) = Taken By, (c) = Cosigned By    Initials Name Provider Type    RT Giuliano Arora PT Physical Therapist                            Therapy Education       17 1713          Therapy Education    Given HEP;Symptoms/condition management;Pain management;Posture/body mechanics  -RT      Program Reinforced  -RT      How Provided Verbal;Demonstration  -RT      Provided to Patient  -RT      Level of Understanding  Verbalized;Demonstrated  -RT        User Key  (r) = Recorded By, (t) = Taken By, (c) = Cosigned By    Initials Name Provider Type    RT Giuliano Arora PT Physical Therapist                PT OP Goals       09/26/17 1700       PT Short Term Goals    STG Date to Achieve 10/10/17  -RT     STG 1 Pt will be instructed in a HEP.  -RT     STG 1 Progress Met  -RT     STG 2 Pt will improve right LE strength to grossly 4-/5.  -RT     STG 2 Progress Progressing  -RT     STG 3 Pt will report pain at average to be 5/10.  -RT     STG 3 Progress Not Met  -RT     Long Term Goals    LTG Date to Achieve 10/24/17  -RT     LTG 1 Pt will report pain no greater than 4/10.  -RT     LTG 1 Progress Not Met  -RT     LTG 2 Pt will be able to stand for 20 min without increased pain.  -RT     LTG 2 Progress Progressing  -RT     Time Calculation    PT Goal Re-Cert Due Date 10/24/17  -RT       User Key  (r) = Recorded By, (t) = Taken By, (c) = Cosigned By    Initials Name Provider Type    RT Giuliano Arora, PT Physical Therapist                PT Assessment/Plan       09/26/17 1720       PT Assessment    Functional Limitations Performance in leisure activities;Performance in self-care ADL;Limitations in functional capacity and performance  -RT     Impairments Pain;Balance;Gait;Range of motion;Poor body mechanics;Muscle strength;Sensation  -RT     Assessment Comments Pt is a 76 y/o male referred to therapy for treatmetn of back and right knee pain.  Pt reports mild improvements with therapy, yet cont to report increased pain in the right knee.  Pt will be followed for 2-3 sessions with final instruction in HEP.  -RT     Please refer to paper survey for additional self-reported information Yes  -RT     Rehab Potential Good  -RT     Patient/caregiver participated in establishment of treatment plan and goals Yes  -RT     Patient would benefit from skilled therapy intervention Yes  -RT     PT Plan    PT Frequency 2x/week  -RT     Predicted Duration  of Therapy Intervention (days/wks) 2 weeks  -RT     Planned CPT's? PT RE-EVAL: 37104;PT THER PROC EA 15 MIN: 98071;PT THER ACT EA 15 MIN: 61907;PT MANUAL THERAPY EA 15 MIN: 34907;PT NEUROMUSC RE-EDUCATION EA 15 MIN: 62255;PT GAIT TRAINING EA 15 MIN: 03358;PT ELECTRICAL STIM UNATTEND: ;PT ULTRASOUND EA 15 MIN: 16898;PT HOT/COLD PACK WC NONMCARE: 86512  -RT     Physical Therapy Interventions (Optional Details) balance training;home exercise program;gait training;joint mobilization;postural re-education;patient/family education;neuromuscular re-education;modalities;manual therapy techniques;lumbar stabilization;ROM (Range of Motion);strengthening;stretching;taping;transfer training  -RT     PT Plan Comments Will follow progressing to DC.  -RT       User Key  (r) = Recorded By, (t) = Taken By, (c) = Cosigned By    Initials Name Provider Type    RT Giuliano Arora, PT Physical Therapist                Modalities       09/26/17 1700          Subjective Comments    Subjective Comments Pt reports therapy has been helping with mobility.  -RT      Subjective Pain    Able to rate subjective pain? yes  -RT      Pre-Treatment Pain Level 6  -RT      Post-Treatment Pain Level 4  -RT      Moist Heat    MH Applied Yes  -RT      Location right knee and lumbar  -RT      Rx Minutes 10 mins  -RT      MH Prior to Rx Yes  -RT      ELECTRICAL STIMULATION    Attended/Unattended Unattended  -RT      Stimulation Type IFC  -RT      Location/Electrode Placement/Other lumbar region  -RT      Rx Minutes 10 mins  -RT        User Key  (r) = Recorded By, (t) = Taken By, (c) = Cosigned By    Initials Name Provider Type    RT Giuliano Arora, PT Physical Therapist              Exercises       09/26/17 1700          Subjective Comments    Subjective Comments Pt reports therapy has been helping with mobility.  -RT      Subjective Pain    Able to rate subjective pain? yes  -RT      Pre-Treatment Pain Level 6  -RT      Post-Treatment Pain Level 4   -RT      Exercise 1    Exercise Name 1 LTR 15x2, PPT 15x2, SAQ 15x2, QS 15x2, heel slides x10, ball squeeze 10x2, scap squeeze 10x2  -RT      Cueing 1 Verbal;Tactile;Demo  -RT      Time (Minutes) 1 30 min  -RT        User Key  (r) = Recorded By, (t) = Taken By, (c) = Cosigned By    Initials Name Provider Type    RT Giuliano Arora, PT Physical Therapist           Manual Rx (last 36 hours)      Manual Treatments       09/26/17 1700          Manual Rx 1    Manual Rx 1 Location Lumbar  -RT      Manual Rx 1 Type Soft tissue mobilization  -RT      Manual Rx 1 Grade as to pt's tolerance  -RT      Manual Rx 1 Duration 10 min  -RT        User Key  (r) = Recorded By, (t) = Taken By, (c) = Cosigned By    Initials Name Provider Type    RT Giuliano Arora, PT Physical Therapist                            Time Calculation:   Start Time: 1355  Stop Time: 1455  Time Calculation (min): 60 min     Therapy Charges for Today     Code Description Service Date Service Provider Modifiers Qty    88541423795 HC PT MOBILITY CURRENT 9/26/2017 Giuliano Arora, PT GP, CK 1    08345880128 HC PT MOBILITY PROJECTED 9/26/2017 Giuliano Arora, PT GP, CJ 1    25427966381 HC PT THER PROC EA 15 MIN 9/26/2017 Giuliano Arora, PT GP 2    01976871274 HC PT MANUAL THERAPY EA 15 MIN 9/26/2017 Giuliano Arora, PT GP 1    30083736706 HC PT HOT/COLD PACK WC NONMCARE 9/26/2017 Giuliano Arora, PT GP 1    1942188 HC PT ELECTRICAL STIM UNATTENDED 9/26/2017 Giuliano Arora, PT  1          PT G-Codes  PT Professional Judgement Used?: Yes  Score: 40%  Functional Limitation: Mobility: Walking and moving around  Mobility: Walking and Moving Around Current Status (): At least 40 percent but less than 60 percent impaired, limited or restricted  Mobility: Walking and Moving Around Goal Status (): At least 20 percent but less than 40 percent impaired, limited or restricted         Giuliano Arora, PT  9/26/2017

## 2017-09-28 ENCOUNTER — HOSPITAL ENCOUNTER (OUTPATIENT)
Dept: PHYSICAL THERAPY | Facility: HOSPITAL | Age: 77
Setting detail: THERAPIES SERIES
Discharge: HOME | End: 2017-09-28

## 2017-09-28 DIAGNOSIS — G89.29 CHRONIC PAIN OF RIGHT KNEE: ICD-10-CM

## 2017-09-28 DIAGNOSIS — M54.50 LUMBAR PAIN WITH RADIATION DOWN RIGHT LEG: Primary | ICD-10-CM

## 2017-09-28 DIAGNOSIS — M25.561 CHRONIC PAIN OF RIGHT KNEE: ICD-10-CM

## 2017-09-28 DIAGNOSIS — M79.604 LUMBAR PAIN WITH RADIATION DOWN RIGHT LEG: Primary | ICD-10-CM

## 2017-09-28 PROCEDURE — 97010 HOT OR COLD PACKS THERAPY: CPT

## 2017-09-28 PROCEDURE — 97110 THERAPEUTIC EXERCISES: CPT

## 2017-09-28 PROCEDURE — G0283 ELEC STIM OTHER THAN WOUND: HCPCS

## 2017-09-28 NOTE — THERAPY TREATMENT NOTE
Outpatient Physical Therapy Ortho Treatment Note  MICKY Lacey     Patient Name: Leodan Posadas  : 1940  MRN: [id number]  Today's Date: 2017      Visit Date: 2017    Visit Dx:    ICD-10-CM ICD-9-CM   1. Lumbar pain with radiation down right leg M54.5 724.2   2. Chronic pain of right knee M25.561 719.46    G89.29 338.29       Patient Active Problem List   Diagnosis   • CAD (coronary artery disease), native coronary artery   • Hyperlipidemia   • Hypertension   • PAC (premature atrial contraction)   • Medication intolerance   • Status post coronary artery stent placement   • Chest pain with moderate risk for cardiac etiology        Past Medical History:   Diagnosis Date   • Arthritis    • CAD (coronary artery disease)     s/p CABG with occluded grafts   • Carpal tunnel syndrome on both sides    • Hyperlipidemia    • Hypertension         Past Surgical History:   Procedure Laterality Date   • CARDIAC CATHETERIZATION     • CORONARY ARTERY BYPASS GRAFT     • HERNIA REPAIR     • KNEE ARTHROSCOPY     • PERIPHERAL ARTERIAL STENT GRAFT     • SHOULDER SURGERY Right    • SINUS SURGERY               PT Ortho       17 1700    Subjective Comments    Subjective Comments Patient states that his back is doing better from therapy but his R) knee continues to be hurting.   -AC    Subjective Pain    Able to rate subjective pain? yes  -AC    Pre-Treatment Pain Level 4  -AC    Post-Treatment Pain Level 4  -AC      17 1700    Myotomal Screen- Lower Quarter Clearing    Hip flexion (L2) Right:;3+ (Fair +);Left:;4+ (Good +)  -RT    Knee extension (L3) Right:;3+ (Fair +);Left:;4+ (Good +)  -RT    Knee flexion (S2) Bilateral:;4- (Good -)  -RT    Lumbar ROM Screen- Lower Quarter Clearing    Lumbar Flexion Impaired   50%  -RT    Lumbar Extension Impaired  -RT      User Key  (r) = Recorded By, (t) = Taken By, (c) = Cosigned By    Initials Name Provider Type    DRE Hernandes, PTA Physical Therapy Assistant     RT Giuliano Arora, PT Physical Therapist                            PT Assessment/Plan       09/28/17 1715       PT Assessment    Assessment Comments Patient tolerated treatment session well with rest breaks taken as needed by the patient. Educated patient to perform ther ex per the patient's tolerance, patient verbalized understanding. Pain remained the same from pre to post treatment session. Manual not performed today per patient's request.   -AC     PT Plan    PT Plan Comments Continue per PT's POC, progress per the patient's tolerance.  -AC       User Key  (r) = Recorded By, (t) = Taken By, (c) = Cosigned By    Initials Name Provider Type     Azalea Hernandes, MARCELINO Physical Therapy Assistant                Modalities       09/28/17 1700          Moist Heat    MH Applied Yes   No redness noted following moist heat  -AC      Location right knee and lumbar  -AC      Rx Minutes 10 mins  -AC      MH Prior to Rx Yes  -AC      ELECTRICAL STIMULATION    Attended/Unattended Unattended   No irritation noted following estim  -AC      Stimulation Type IFC  -AC      Max mAmp --   per the patient's tolerance  -AC      Location/Electrode Placement/Other lumbar region  -AC      Rx Minutes 10 mins  -AC        User Key  (r) = Recorded By, (t) = Taken By, (c) = Cosigned By    Initials Name Provider Type     Azalea Hernandes, MARCELINO Physical Therapy Assistant                Exercises       09/28/17 1700          Subjective Comments    Subjective Comments Patient states that his back is doing better from therapy but his R) knee continues to be hurting.   -AC      Subjective Pain    Able to rate subjective pain? yes  -AC      Pre-Treatment Pain Level 4  -AC      Post-Treatment Pain Level 4  -AC      Exercise 1    Exercise Name 1 LTR 15x2, PPT 15x2, SAQ 15x2, QS 15x2, heel slides x10, ball squeeze 10x2, scap squeeze 10x2, SKTC 20 sec hold x3  -AC      Cueing 1 Verbal;Tactile;Demo  -AC      Time (Minutes) 1 30 minutes  -AC         User Key  (r) = Recorded By, (t) = Taken By, (c) = Cosigned By    Initials Name Provider Type    DRE Hernandes PTA Physical Therapy Assistant                                   Therapy Education       09/28/17 1715          Therapy Education    Given Symptoms/condition management;HEP;Pain management;Posture/body mechanics  -AC      Program Reinforced  -AC      How Provided Verbal;Demonstration  -AC      Provided to Patient  -AC      Level of Understanding Verbalized;Demonstrated  -AC        User Key  (r) = Recorded By, (t) = Taken By, (c) = Cosigned By    Initials Name Provider Type    DRE Hernandes PTA Physical Therapy Assistant                Time Calculation:   Start Time: 1455  Stop Time: 1550  Time Calculation (min): 55 min    Therapy Charges for Today     Code Description Service Date Service Provider Modifiers Qty    85549946344 HC PT THER PROC EA 15 MIN 9/28/2017 Azalea Hernandes PTA GP 2    29730007246 HC PT HOT/COLD PACK WC NONMCARE 9/28/2017 Azalea Hernandes PTA GP 1    08056535871 HC PT ELECTRICAL STIM UNATTENDED 9/28/2017 Azalea Hernandes PTA  1    97819232090 HC PT THER SUPP EA 15 MIN 9/28/2017 Azalea Hernandes PTA GP 1                    Azalea Hernandes PTA  9/28/2017

## 2017-10-03 ENCOUNTER — HOSPITAL ENCOUNTER (OUTPATIENT)
Dept: PHYSICAL THERAPY | Facility: HOSPITAL | Age: 77
Setting detail: THERAPIES SERIES
Discharge: HOME | End: 2017-10-03

## 2017-10-03 DIAGNOSIS — M25.561 CHRONIC PAIN OF RIGHT KNEE: ICD-10-CM

## 2017-10-03 DIAGNOSIS — M54.50 LUMBAR PAIN WITH RADIATION DOWN RIGHT LEG: Primary | ICD-10-CM

## 2017-10-03 DIAGNOSIS — M79.604 LUMBAR PAIN WITH RADIATION DOWN RIGHT LEG: Primary | ICD-10-CM

## 2017-10-03 DIAGNOSIS — G89.29 CHRONIC PAIN OF RIGHT KNEE: ICD-10-CM

## 2017-10-03 PROCEDURE — G0283 ELEC STIM OTHER THAN WOUND: HCPCS

## 2017-10-03 PROCEDURE — 97110 THERAPEUTIC EXERCISES: CPT

## 2017-10-03 PROCEDURE — 97010 HOT OR COLD PACKS THERAPY: CPT

## 2017-10-03 PROCEDURE — 97140 MANUAL THERAPY 1/> REGIONS: CPT

## 2017-10-03 NOTE — THERAPY TREATMENT NOTE
Outpatient Physical Therapy Ortho Treatment Note  MICKY Lacey     Patient Name: Leodan Posadas  : 1940  MRN: [id number]  Today's Date: 10/3/2017      Visit Date: 10/03/2017    Visit Dx:    ICD-10-CM ICD-9-CM   1. Lumbar pain with radiation down right leg M54.5 724.2   2. Chronic pain of right knee M25.561 719.46    G89.29 338.29       Patient Active Problem List   Diagnosis   • CAD (coronary artery disease), native coronary artery   • Hyperlipidemia   • Hypertension   • PAC (premature atrial contraction)   • Medication intolerance   • Status post coronary artery stent placement   • Chest pain with moderate risk for cardiac etiology        Past Medical History:   Diagnosis Date   • Arthritis    • CAD (coronary artery disease)     s/p CABG with occluded grafts   • Carpal tunnel syndrome on both sides    • Hyperlipidemia    • Hypertension         Past Surgical History:   Procedure Laterality Date   • CARDIAC CATHETERIZATION     • CORONARY ARTERY BYPASS GRAFT     • HERNIA REPAIR     • KNEE ARTHROSCOPY     • PERIPHERAL ARTERIAL STENT GRAFT     • SHOULDER SURGERY Right    • SINUS SURGERY               PT Ortho       10/03/17 0900    Subjective Comments    Subjective Comments Patient states his knee is stiff and painful this morning.  Pt reports 5/10 low back and R) knee pain prior to tx.   -KATALINA    Subjective Pain    Able to rate subjective pain? yes  -KATALINA    Pre-Treatment Pain Level 5  -KATALINA    Post-Treatment Pain Level 4  -KATALINA      User Key  (r) = Recorded By, (t) = Taken By, (c) = Cosigned By    Initials Name Provider Type    KATALINA Hogan, PTA Physical Therapy Assistant                            PT Assessment/Plan       10/03/17 1022       PT Assessment    Assessment Comments Patient tolerated tx well today w/ reports of decreased pain at conclusion of session.  Pt has one addittional PT visit scheduled and plans to discharge following.  Pt displayed good mechanics throughout session and received  "intermittent cues as needed.  Pt continues to progress as tolerated.  No adverse reactions observed folloiwng modalities.   -KATALINA     PT Plan    PT Plan Comments Continue with PT's POC and progress tx as tolerated by patient.   -KATALINA       User Key  (r) = Recorded By, (t) = Taken By, (c) = Cosigned By    Initials Name Provider Type    KATALINA Hogan PTA Physical Therapy Assistant                Modalities       10/03/17 0900          Moist Heat    MH Applied Yes  -KATALINA      Location right knee and lumbar  -KATALINA      Rx Minutes 15 mins  -KATALINA      MH Prior to Rx Yes   w/ estim in seated position  -KATALINA      ELECTRICAL STIMULATION    Attended/Unattended Unattended  -KATALINA      Stimulation Type IFC  -KATALINA      Max mAmp --   as to pt's tolerance  -KATALINA      Location/Electrode Placement/Other lumbar region  -KATALINA      Rx Minutes 15 mins  -KATALINA        User Key  (r) = Recorded By, (t) = Taken By, (c) = Cosigned By    Initials Name Provider Type    KATALINA Hogan PTA Physical Therapy Assistant                Exercises       10/03/17 0900          Subjective Comments    Subjective Comments Patient states his knee is stiff and painful this morning.  Pt reports 5/10 low back and R) knee pain prior to tx.   -KATALINA      Subjective Pain    Able to rate subjective pain? yes  -KATALINA      Pre-Treatment Pain Level 5  -KATALINA      Post-Treatment Pain Level 4  -KATALINA      Exercise 1    Exercise Name 1 LTR 15x2, PPT 15x2, SKTC 3x20\", SAQ 15x2, QS 15x2, ball squeeze 15x2  -KATALINA      Cueing 1 Verbal;Tactile;Demo  -KATALINA      Time (Minutes) 1 25 min  -KATALINA        User Key  (r) = Recorded By, (t) = Taken By, (c) = Cosigned By    Initials Name Provider Type    KATALINA Hogan PTA Physical Therapy Assistant                        Manual Rx (last 36 hours)      Manual Treatments       10/03/17 0900          Manual Rx 1    Manual Rx 1 Location lumbar musculature/ paraspinals  -KATALINA      Manual Rx 1 Type Soft tissue mobilization  -KATALINA      Manual Rx 1 Grade " as to pt's tolerance  -KATALINA      Manual Rx 1 Duration 15 min  -KATALINA        User Key  (r) = Recorded By, (t) = Taken By, (c) = Cosigned By    Initials Name Provider Type    KATALINA Hogan PTA Physical Therapy Assistant                    Therapy Education       10/03/17 0923          Therapy Education    Given HEP;Symptoms/condition management;Pain management;Posture/body mechanics  -KATALINA      Program Reinforced  -KATALINA      How Provided Verbal;Demonstration  -KATALINA      Provided to Patient  -KATALINA      Level of Understanding Verbalized;Demonstrated  -KATALINA        User Key  (r) = Recorded By, (t) = Taken By, (c) = Cosigned By    Initials Name Provider Type    KATALINA Hogan PTA Physical Therapy Assistant                Time Calculation:   Start Time: 0905  Stop Time: 1007  Time Calculation (min): 62 min    Therapy Charges for Today     Code Description Service Date Service Provider Modifiers Qty    45468803724 HC PT THER PROC EA 15 MIN 10/3/2017 Estelle Hogan PTA GP 2    36362502577 HC PT MANUAL THERAPY EA 15 MIN 10/3/2017 Estelle Hogan PTA GP 1    12938931182 HC PT ELECTRICAL STIM UNATTENDED 10/3/2017 Estelle Hogan PTA  1    60452359936 HC PT HOT/COLD PACK WC NONMCARE 10/3/2017 Estelle Hogan PTA GP 1                    Estelle Hogan PTA  10/3/2017

## 2017-10-05 ENCOUNTER — HOSPITAL ENCOUNTER (OUTPATIENT)
Dept: PHYSICAL THERAPY | Facility: HOSPITAL | Age: 77
Setting detail: THERAPIES SERIES
Discharge: HOME | End: 2017-10-05

## 2017-10-05 DIAGNOSIS — M54.50 LUMBAR PAIN WITH RADIATION DOWN RIGHT LEG: Primary | ICD-10-CM

## 2017-10-05 DIAGNOSIS — G89.29 CHRONIC PAIN OF RIGHT KNEE: ICD-10-CM

## 2017-10-05 DIAGNOSIS — M79.604 LUMBAR PAIN WITH RADIATION DOWN RIGHT LEG: Primary | ICD-10-CM

## 2017-10-05 DIAGNOSIS — M25.561 CHRONIC PAIN OF RIGHT KNEE: ICD-10-CM

## 2017-10-05 PROCEDURE — 97110 THERAPEUTIC EXERCISES: CPT

## 2017-10-05 PROCEDURE — G0283 ELEC STIM OTHER THAN WOUND: HCPCS

## 2017-10-05 PROCEDURE — 97010 HOT OR COLD PACKS THERAPY: CPT

## 2017-10-05 NOTE — THERAPY DISCHARGE NOTE
Outpatient Physical Therapy Ortho Treatment Note/Discharge Summary  MICKY Lacey     Patient Name: Leodan Posadas  : 1940  MRN: [id number]  Today's Date: 10/5/2017      Visit Date: 10/05/2017    Visit Dx:    ICD-10-CM ICD-9-CM   1. Lumbar pain with radiation down right leg M54.5 724.2   2. Chronic pain of right knee M25.561 719.46    G89.29 338.29       Patient Active Problem List   Diagnosis   • CAD (coronary artery disease), native coronary artery   • Hyperlipidemia   • Hypertension   • PAC (premature atrial contraction)   • Medication intolerance   • Status post coronary artery stent placement   • Chest pain with moderate risk for cardiac etiology        Past Medical History:   Diagnosis Date   • Arthritis    • CAD (coronary artery disease)     s/p CABG with occluded grafts   • Carpal tunnel syndrome on both sides    • Hyperlipidemia    • Hypertension         Past Surgical History:   Procedure Laterality Date   • CARDIAC CATHETERIZATION     • CORONARY ARTERY BYPASS GRAFT     • HERNIA REPAIR     • KNEE ARTHROSCOPY     • PERIPHERAL ARTERIAL STENT GRAFT     • SHOULDER SURGERY Right    • SINUS SURGERY               PT Ortho       10/05/17 1600    ROM (Range of Motion)    General ROM Detail right knee   -RT      10/05/17 1500    Sensory Screen for Light Touch- Lower Quarter Clearing    L1 (inguinal area) Right:;Diminished;Left:;Intact  -RT    L2 (anterior mid thigh) Right:;Diminished;Left:;Intact  -RT    L3 (distal anterior thigh) Right:;Diminished;Left:;Intact  -RT    L4 (medial lower leg/foot) Right:;Diminished;Left:;Intact  -RT    L5 (lateral lower leg/great toe) Bilateral:;Intact  -RT    S1 (bottom of foot) Bilateral:;Intact  -RT    Myotomal Screen- Lower Quarter Clearing    Hip flexion (L2) Right:;3+ (Fair +);Left:;4+ (Good +)  -RT    Knee extension (L3) Right:;3+ (Fair +);Left:;4+ (Good +)  -RT    Knee flexion (S2) Bilateral:;4- (Good -)  -RT    Lumbar ROM Screen- Lower Quarter Clearing    Lumbar  Flexion --   50%  -RT    Lumbar Extension --   50%  -RT      10/03/17 0900    Subjective Comments    Subjective Comments Patient states his knee is stiff and painful this morning.  Pt reports 5/10 low back and R) knee pain prior to tx.   -KATALINA    Subjective Pain    Able to rate subjective pain? yes  -KATALINA    Pre-Treatment Pain Level 5  -KATALINA    Post-Treatment Pain Level 4  -KATALINA      User Key  (r) = Recorded By, (t) = Taken By, (c) = Cosigned By    Initials Name Provider Type    KATALINA Estelle Hogan PTA Physical Therapy Assistant    RT Giuliano Arora, PT Physical Therapist                            PT Assessment/Plan       10/05/17 1700       PT Assessment    Assessment Comments Tx consisted of mh and estim to back followed by review of hep and back safety.  Pt demonstrated a good understanding of exercises today.  See discharge.  -RT     PT Plan    PT Plan Comments Pt may benefit from TENS unit and knee brace for pain control.  Pt has been advised to follow up with his ortho MD for treatment of the right knee.  -RT       User Key  (r) = Recorded By, (t) = Taken By, (c) = Cosigned By    Initials Name Provider Type    RT Giuliano Arora, PT Physical Therapist                Modalities       10/05/17 1500          Subjective Comments    Subjective Comments Pt reports therapy has helped his back pain, but he cont to have knee pain between 6-8/10.  Pt reports the estim has helped.  Pt reports having increased knee instability with occasional medial knee edema.  -RT      Subjective Pain    Able to rate subjective pain? yes  -RT      Pre-Treatment Pain Level 5  -RT      Post-Treatment Pain Level 4  -RT      Moist Heat    MH Applied Yes  -RT      Location right knee and lumbar  -RT      Rx Minutes 15 mins  -RT      MH Prior to Rx Yes  -RT      ELECTRICAL STIMULATION    Attended/Unattended Unattended  -RT      Stimulation Type IFC  -RT      Location/Electrode Placement/Other lumbar region  -RT      Rx Minutes 15 mins  -RT         User Key  (r) = Recorded By, (t) = Taken By, (c) = Cosigned By    Initials Name Provider Type    RT Giuliano Arora, PT Physical Therapist                Exercises       10/05/17 1500          Subjective Comments    Subjective Comments Pt reports therapy has helped his back pain, but he cont to have knee pain between 6-8/10.  Pt reports the estim has helped.  Pt reports having increased knee instability with occasional medial knee edema.  -RT      Subjective Pain    Able to rate subjective pain? yes  -RT      Pre-Treatment Pain Level 5  -RT      Post-Treatment Pain Level 4  -RT      Exercise 1    Exercise Name 1 Review of hep and back safety.  -RT      Cueing 1 Verbal;Tactile;Demo  -RT      Time (Minutes) 1 25min  -RT        User Key  (r) = Recorded By, (t) = Taken By, (c) = Cosigned By    Initials Name Provider Type    RT Giuliano Arora, PT Physical Therapist                               PT OP Goals       10/05/17 1600       PT Short Term Goals    STG Date to Achieve 10/10/17  -RT     STG 1 Pt will be instructed in a HEP.  -RT     STG 1 Progress Met  -RT     STG 2 Pt will improve right LE strength to grossly 4-/5.  -RT     STG 2 Progress Progressing  -RT     STG 3 Pt will report pain at average to be 5/10.  -RT     STG 3 Progress Not Met  -RT     Long Term Goals    LTG Date to Achieve 10/24/17  -RT     LTG 1 Pt will report pain no greater than 4/10.  -RT     LTG 1 Progress Not Met  -RT     LTG 2 Pt will be able to stand for 20 min without increased pain.  -RT     LTG 2 Progress Progressing  -RT     LTG 3 Pt will improved right knee rom to - degrees.  -RT     LTG 3 Progress Progressing  -RT       User Key  (r) = Recorded By, (t) = Taken By, (c) = Cosigned By    Initials Name Provider Type    RT Giuliano Arora, PT Physical Therapist                Therapy Education       10/05/17 1656          Therapy Education    Given HEP;Symptoms/condition management;Pain management;Posture/body mechanics  -RT       Program Reinforced  -RT      How Provided Verbal;Demonstration  -RT      Provided to Patient  -RT      Level of Understanding Verbalized;Demonstrated  -RT        User Key  (r) = Recorded By, (t) = Taken By, (c) = Cosigned By    Initials Name Provider Type    RT Giuliano Arora, PT Physical Therapist                Time Calculation:        Therapy Charges for Today     Code Description Service Date Service Provider Modifiers Qty    60582682484 HC PT THER PROC EA 15 MIN 10/5/2017 Giuliano Arora, PT GP 2    28678124643 HC PT HOT/COLD PACK WC NONMCARE 10/5/2017 Giuliano Arora, PT GP 1    78632574011 HC PT ELECTRICAL STIM UNATTENDED 10/5/2017 Giuliano Arora, PT  1                OP PT Discharge Summary  Date of Discharge: 10/05/17  Reason for Discharge: Maximum functional potential achieved  Outcomes Achieved: Patient able to partially acheive established goals  Discharge Destination: Home with home program  Discharge Instructions: Pt has demonstrated good effort and attendance with therapy with decreased back pain and similar right knee pain.  Pt reports he may seek surgical intervention to the knee due to chronic pain.  Pt responded well to estim and may benefit from a TENS unit and knee brace for pain control.  Pt advised to contact therapy as needed.      Giuliano Arora PT  10/5/2017

## 2017-10-26 ENCOUNTER — EPISODE CHANGES (OUTPATIENT)
Dept: CASE MANAGEMENT | Facility: OTHER | Age: 77
End: 2017-10-26

## 2018-04-11 ENCOUNTER — HOSPITAL ENCOUNTER (OUTPATIENT)
Dept: PHYSICAL THERAPY | Facility: HOSPITAL | Age: 78
Setting detail: THERAPIES SERIES
Discharge: HOME | End: 2018-04-11

## 2018-04-11 DIAGNOSIS — G89.29 CHRONIC PAIN OF BOTH KNEES: ICD-10-CM

## 2018-04-11 DIAGNOSIS — M25.562 CHRONIC PAIN OF BOTH KNEES: ICD-10-CM

## 2018-04-11 DIAGNOSIS — M25.561 CHRONIC PAIN OF RIGHT KNEE: ICD-10-CM

## 2018-04-11 DIAGNOSIS — M25.561 CHRONIC PAIN OF BOTH KNEES: ICD-10-CM

## 2018-04-11 DIAGNOSIS — G89.29 CHRONIC PAIN OF RIGHT KNEE: ICD-10-CM

## 2018-04-11 PROCEDURE — G8978 MOBILITY CURRENT STATUS: HCPCS

## 2018-04-11 PROCEDURE — 97163 PT EVAL HIGH COMPLEX 45 MIN: CPT

## 2018-04-11 PROCEDURE — G8979 MOBILITY GOAL STATUS: HCPCS

## 2018-04-11 NOTE — THERAPY EVALUATION
Outpatient Physical Therapy Ortho Initial Evaluation  MICKY Lacey     Patient Name: Leodan Posadas  : 1940  MRN: [id number]  Today's Date: 2018      Visit Date: 2018    Patient Active Problem List   Diagnosis   • CAD (coronary artery disease), native coronary artery   • Hyperlipidemia   • Hypertension   • PAC (premature atrial contraction)   • Medication intolerance   • Status post coronary artery stent placement   • Chest pain with moderate risk for cardiac etiology        Past Medical History:   Diagnosis Date   • Arthritis    • CAD (coronary artery disease)     s/p CABG with occluded grafts   • Carpal tunnel syndrome on both sides    • Hyperlipidemia    • Hypertension         Past Surgical History:   Procedure Laterality Date   • CARDIAC CATHETERIZATION     • CORONARY ARTERY BYPASS GRAFT     • HERNIA REPAIR     • KNEE ARTHROSCOPY     • PERIPHERAL ARTERIAL STENT GRAFT     • SHOULDER SURGERY Right    • SINUS SURGERY         Visit Dx:     ICD-10-CM ICD-9-CM   1. Chronic pain of both knees M25.561 719.46    M25.562 338.29    G89.29    2. Chronic pain of right knee M25.561 719.46    G89.29 338.29             Patient History     Row Name 18 0800             History    Chief Complaint Difficulty Walking;Pain;Joint stiffness;Difficulty with daily activities  -RT      Type of Pain Back pain;Lower Extremity / Leg;Knee pain   right  -RT      Date Current Problem(s) Began --   Summer of 2004  -RT      Brief Description of Current Complaint Pt suffered an injury at work in the Summer of 2004.  The patient has suffered back and right leg pain since the injury.  The patient was recently evaluated and was advised to initiate therapy for treatment.  -RT      Patient/Caregiver Goals Relieve pain;Return to prior level of function;Improve mobility;Improve strength  -RT      Current Tobacco Use no  -RT      Smoking Status no  -RT      Patient's Rating of General Health Good  -RT      Hand Dominance right-handed   -RT      How has patient tried to help current problem? meds, rest, heat  -RT         Pain     Pain Location Back;Leg   right  -RT      Pain at Present 8  -RT      Pain at Best 0  -RT      Pain at Worst 10  -RT      Pain Frequency Intermittent  -RT      Pain Description Aching;Burning;Shooting  -RT      What Performance Factors Make the Current Problem(s) WORSE? walking, squatting, bending  -RT      Tolerance Time- Standing 10min  -RT      Tolerance Time- Sitting 30min  -RT         Fall Risk Assessment    Any falls in the past year: Yes  -RT      Number of falls reported in the last 12 months 3  -RT      Factors that contributed to the fall: Fatigue  -RT         Daily Activities    Primary Language English  -RT      How does patient learn best? Listening;Reading;Demonstration;Pictures/Video  -RT         Safety    Are you being hurt, hit, or frightened by anyone at home or in your life? No  -RT      Are you being neglected by a caregiver No  -RT        User Key  (r) = Recorded By, (t) = Taken By, (c) = Cosigned By    Initials Name Provider Type    RT Giuliano Arora PT Physical Therapist                PT Ortho     Row Name 04/11/18 1000       Special Tests/Palpation    Special Tests/Palpation Lumbar/SI  -RT    Row Name 04/11/18 0800       Posture/Observations    Posture/Observations Comments pt amb with fwd flexed posture with decreased stance on right  -RT       Neural Tension Signs- Lower Quarter Clearing    Slump Right:;Positive  -RT       Sensory Screen for Light Touch- Lower Quarter Clearing    L1 (inguinal area) Right:;Diminished  -RT    L2 (anterior mid thigh) Right:;Diminished  -RT    L3 (distal anterior thigh) Right:;Diminished  -RT    L4 (medial lower leg/foot) Right:;Diminished  -RT    L5 (lateral lower leg/great toe) Right:;Diminished  -RT    S1 (bottom of foot) Right:;Diminished  -RT       Myotomal Screen- Lower Quarter Clearing    Hip flexion (L2) Right:;3 (Fair)  -RT    Knee extension (L3) Right:;3  (Fair)  -RT    Knee flexion (S2) Right:;3 (Fair)  -RT       Lumbar ROM Screen- Lower Quarter Clearing    Lumbar Flexion Impaired   50%  -RT    Lumbar Extension Impaired   neutral  -RT    Lumbar Rotation Impaired   50%  -RT       Lumbosacral Palpation    SI Bilateral:;Tender  -RT    Spinous Process Tender  -RT    Piriformis Bilateral:;Tender  -RT    Erector Spinae (Paraspinals) Bilateral:;Tender  -RT       Knee Palpation    Patella Right:;Tender  -RT    Medial Joint Line Right:;Tender  -RT    Lateral Joint Line Right:;Tender  -RT       General ROM    GENERAL ROM COMMENTS right 20-85; left 0-135  -RT      User Key  (r) = Recorded By, (t) = Taken By, (c) = Cosigned By    Initials Name Provider Type    RT Giuliano Arora, PT Physical Therapist                      Therapy Education  Given: HEP, Symptoms/condition management, Pain management, Posture/body mechanics  Program: Reinforced  How Provided: Verbal, Demonstration  Provided to: Patient  Level of Understanding: Verbalized, Demonstrated           PT OP Goals     Row Name 04/11/18 1000          PT Short Term Goals    STG Date to Achieve 04/25/18  -RT     STG 1 Pt will be instructed in a HEP.  -RT     STG 1 Progress New  -RT     STG 2 Pt will report worst pain no greater than 5/10.  -RT     STG 2 Progress New  -RT        Long Term Goals    LTG Date to Achieve 05/09/18  -RT     LTG 1 Pt will be able to stand for 45 min without increased pain.  -RT     LTG 1 Progress New  -RT     LTG 2 Pt's right knee ROM will be at least 10-110degrees.  -RT     LTG 2 Progress New  -RT     LTG 3 Pt will improve right LE strength to at least 4-/5 grossly.  -RT     LTG 3 Progress New  -RT        Time Calculation    PT Goal Re-Cert Due Date 05/09/18  -RT       User Key  (r) = Recorded By, (t) = Taken By, (c) = Cosigned By    Initials Name Provider Type    RT Giuliano Arora, PT Physical Therapist                PT Assessment/Plan     Row Name 04/11/18 1045          PT Assessment     Functional Limitations Performance in leisure activities;Performance in self-care ADL;Limitations in functional capacity and performance  -RT     Impairments Pain;Balance;Gait;Range of motion;Poor body mechanics;Muscle strength;Sensation  -RT     Assessment Comments Pt is a 77 y/o male referred to therapy for treatment of bilateral knee pain.  Pt presents with decreased ROM, decreased strength and increased pain.  Pt will benefit from therapy for instruction in a HEP and decreased pain in order to improve ADLs.  -RT     Please refer to paper survey for additional self-reported information Yes  -RT     Rehab Potential Good  -RT     Patient/caregiver participated in establishment of treatment plan and goals Yes  -RT     Patient would benefit from skilled therapy intervention Yes  -RT        PT Plan    PT Frequency 2x/week  -RT     Predicted Duration of Therapy Intervention (OT Eval) 3 weeks  -RT     Planned CPT's? PT EVAL HIGH COMPLEXITY: 22984;PT RE-EVAL: 61314;PT THER PROC EA 15 MIN: 66102;PT THER ACT EA 15 MIN: 37677;PT NEUROMUSC RE-EDUCATION EA 15 MIN: 55028;PT MANUAL THERAPY EA 15 MIN: 52411;PT GAIT TRAINING EA 15 MIN: 04340;PT ELECTRICAL STIM UNATTEND: ;PT ULTRASOUND EA 15 MIN: 95807;PT HOT OR COLD PACK TREAT MCARE  -RT     Physical Therapy Interventions (Optional Details) balance training;lumbar stabilization;joint mobilization;home exercise program;gait training;manual therapy techniques;modalities;neuromuscular re-education;stretching;strengthening;ROM (Range of Motion);stair training;postural re-education;patient/family education;taping  -RT     PT Plan Comments Will follow for optimal gains.  -RT       User Key  (r) = Recorded By, (t) = Taken By, (c) = Cosigned By    Initials Name Provider Type    RT Giuliano Arora PT Physical Therapist                                        Time Calculation:   Start Time: 0800  Stop Time: 0900  Time Calculation (min): 60 min     Therapy Charges for Today     Code  Description Service Date Service Provider Modifiers Qty    41897144945 HC PT MOBILITY CURRENT 4/11/2018 Giuliano Arora, PT GP, CL 1    62511564508 HC PT MOBILITY PROJECTED 4/11/2018 Giuliano Arora, PT GP, CK 1    95784261235 HC PT EVAL HIGH COMPLEXITY 4 4/11/2018 Giuliano Arora, PT GP 1          PT G-Codes  PT Professional Judgement Used?: Yes  Score: 70%  Functional Limitation: Mobility: Walking and moving around  Mobility: Walking and Moving Around Current Status (): At least 60 percent but less than 80 percent impaired, limited or restricted  Mobility: Walking and Moving Around Goal Status (): At least 40 percent but less than 60 percent impaired, limited or restricted         Giuliano Arora, PT  4/11/2018

## 2018-04-25 ENCOUNTER — HOSPITAL ENCOUNTER (OUTPATIENT)
Dept: PHYSICAL THERAPY | Facility: HOSPITAL | Age: 78
Setting detail: THERAPIES SERIES
Discharge: HOME | End: 2018-04-25

## 2018-04-25 DIAGNOSIS — G89.29 CHRONIC PAIN OF RIGHT KNEE: ICD-10-CM

## 2018-04-25 DIAGNOSIS — M25.561 CHRONIC PAIN OF RIGHT KNEE: ICD-10-CM

## 2018-04-25 DIAGNOSIS — M25.562 CHRONIC PAIN OF BOTH KNEES: Primary | ICD-10-CM

## 2018-04-25 DIAGNOSIS — G89.29 CHRONIC PAIN OF BOTH KNEES: Primary | ICD-10-CM

## 2018-04-25 DIAGNOSIS — M25.561 CHRONIC PAIN OF BOTH KNEES: Primary | ICD-10-CM

## 2018-04-25 PROCEDURE — 97110 THERAPEUTIC EXERCISES: CPT

## 2018-04-25 PROCEDURE — G0283 ELEC STIM OTHER THAN WOUND: HCPCS

## 2018-04-25 PROCEDURE — 97035 APP MDLTY 1+ULTRASOUND EA 15: CPT

## 2018-04-25 PROCEDURE — 97010 HOT OR COLD PACKS THERAPY: CPT

## 2018-04-25 NOTE — THERAPY TREATMENT NOTE
Outpatient Physical Therapy Ortho Treatment Note  MICKY Lacey     Patient Name: Leodan Posadas  : 1940  MRN: [id number]  Today's Date: 2018      Visit Date: 2018    Visit Dx:    ICD-10-CM ICD-9-CM   1. Chronic pain of both knees M25.561 719.46    M25.562 338.29    G89.29    2. Chronic pain of right knee M25.561 719.46    G89.29 338.29       Patient Active Problem List   Diagnosis   • CAD (coronary artery disease), native coronary artery   • Hyperlipidemia   • Hypertension   • PAC (premature atrial contraction)   • Medication intolerance   • Status post coronary artery stent placement   • Chest pain with moderate risk for cardiac etiology        Past Medical History:   Diagnosis Date   • Arthritis    • CAD (coronary artery disease)     s/p CABG with occluded grafts   • Carpal tunnel syndrome on both sides    • Hyperlipidemia    • Hypertension         Past Surgical History:   Procedure Laterality Date   • CARDIAC CATHETERIZATION     • CORONARY ARTERY BYPASS GRAFT     • HERNIA REPAIR     • KNEE ARTHROSCOPY     • PERIPHERAL ARTERIAL STENT GRAFT     • SHOULDER SURGERY Right    • SINUS SURGERY                               PT Assessment/Plan     Row Name 18 1236          PT Assessment    Assessment Comments Tx today consisted of mh and estim to right knee followed by ther ex and ended with US to right knee.  Pt reported pain with most knee exercises today yet responded well to the modalities with reports of decreased pain.  Pt cont to present with limitations with knee rom.   -RT        PT Plan    PT Plan Comments Will follow progressing knee stability and decreased pain.  -RT       User Key  (r) = Recorded By, (t) = Taken By, (c) = Cosigned By    Initials Name Provider Type    RT Giuliano S Ulysses, PT Physical Therapist                Modalities     Row Name 18 1100             Subjective Comments    Subjective Comments Pt reports having increased right knee pain today.  Pt reports he will  contact his MD for treatment of his back pain.   -RT         Subjective Pain    Able to rate subjective pain? yes  -RT      Pre-Treatment Pain Level 7  -RT      Post-Treatment Pain Level 5  -RT         Moist Heat    MH Applied Yes  -RT      Location right knee  -RT      Rx Minutes 10 mins  -RT      MH Prior to Rx Yes  -RT         Ultrasound 41814    Location right knee  -RT      Rx Minutes 8min  -RT      Duty Cycle 50  -RT      Frequency 3.0 MHz  -RT      Intensity - Wts/cm 1.2  -RT         ELECTRICAL STIMULATION    Attended/Unattended Unattended  -RT      Stimulation Type IFC  -RT      Location/Electrode Placement/Other right knee  -RT      Rx Minutes 10 mins  -RT        User Key  (r) = Recorded By, (t) = Taken By, (c) = Cosigned By    Initials Name Provider Type    RT Giuliano Arora PT Physical Therapist                Exercises     Row Name 04/25/18 1100             Subjective Comments    Subjective Comments Pt reports having increased right knee pain today.  Pt reports he will contact his MD for treatment of his back pain.   -RT         Subjective Pain    Able to rate subjective pain? yes  -RT      Pre-Treatment Pain Level 7  -RT      Post-Treatment Pain Level 5  -RT         Exercise 1    Exercise Name 1 qs 20, knee flexion with belt 20, ap 20, ball sq 20, saq 20, laq 20, knnee flexion rtb 20, hip abd rtb 20  -RT      Cueing 1 Verbal;Tactile;Demo  -RT      Time 1 30min  -RT        User Key  (r) = Recorded By, (t) = Taken By, (c) = Cosigned By    Initials Name Provider Type    RT Giuliano Arora PT Physical Therapist                             Therapy Education  Given: HEP, Symptoms/condition management, Pain management, Posture/body mechanics  Program: Reinforced  How Provided: Verbal, Demonstration  Provided to: Patient  Level of Understanding: Verbalized, Demonstrated              Time Calculation:   Start Time: 0805  Stop Time: 0900  Time Calculation (min): 55 min    Therapy Charges for Today     Code  Description Service Date Service Provider Modifiers Qty    53113845422 HC PT THER PROC EA 15 MIN 4/25/2018 Giuliano Arora, PT GP 2    21198098144 HC PT HOT/COLD PACK WC NONMCARE 4/25/2018 Giuliano Arora, PT GP 1    81990169862 HC PT ELECTRICAL STIM UNATTENDED 4/25/2018 Giuliano Arora, PT  1    11414145486 HC PT ULTRASOUND EA 15 MIN 4/25/2018 Giuliano Arora, PT GP 1                    Giuliano Arora, PT  4/25/2018

## 2018-05-02 ENCOUNTER — HOSPITAL ENCOUNTER (OUTPATIENT)
Dept: PHYSICAL THERAPY | Facility: HOSPITAL | Age: 78
Setting detail: THERAPIES SERIES
Discharge: HOME | End: 2018-05-02

## 2018-05-02 DIAGNOSIS — M25.561 CHRONIC PAIN OF BOTH KNEES: Primary | ICD-10-CM

## 2018-05-02 DIAGNOSIS — M79.604 LUMBAR PAIN WITH RADIATION DOWN RIGHT LEG: ICD-10-CM

## 2018-05-02 DIAGNOSIS — M25.561 CHRONIC PAIN OF RIGHT KNEE: ICD-10-CM

## 2018-05-02 DIAGNOSIS — G89.29 CHRONIC PAIN OF BOTH KNEES: Primary | ICD-10-CM

## 2018-05-02 DIAGNOSIS — M54.50 LUMBAR PAIN WITH RADIATION DOWN RIGHT LEG: ICD-10-CM

## 2018-05-02 DIAGNOSIS — G89.29 CHRONIC PAIN OF RIGHT KNEE: ICD-10-CM

## 2018-05-02 DIAGNOSIS — M25.562 CHRONIC PAIN OF BOTH KNEES: Primary | ICD-10-CM

## 2018-05-02 PROCEDURE — 97140 MANUAL THERAPY 1/> REGIONS: CPT

## 2018-05-02 PROCEDURE — 97010 HOT OR COLD PACKS THERAPY: CPT

## 2018-05-02 PROCEDURE — 97110 THERAPEUTIC EXERCISES: CPT

## 2018-05-02 PROCEDURE — G0283 ELEC STIM OTHER THAN WOUND: HCPCS

## 2018-05-02 NOTE — THERAPY TREATMENT NOTE
Outpatient Physical Therapy Ortho Treatment Note  MICKY Lacey     Patient Name: Leodan Posadas  : 1940  MRN: [id number]  Today's Date: 2018      Visit Date: 2018    Visit Dx:    ICD-10-CM ICD-9-CM   1. Chronic pain of both knees M25.561 719.46    M25.562 338.29    G89.29    2. Chronic pain of right knee M25.561 719.46    G89.29 338.29   3. Lumbar pain with radiation down right leg M54.5 724.2       Patient Active Problem List   Diagnosis   • CAD (coronary artery disease), native coronary artery   • Hyperlipidemia   • Hypertension   • PAC (premature atrial contraction)   • Medication intolerance   • Status post coronary artery stent placement   • Chest pain with moderate risk for cardiac etiology        Past Medical History:   Diagnosis Date   • Arthritis    • CAD (coronary artery disease)     s/p CABG with occluded grafts   • Carpal tunnel syndrome on both sides    • Hyperlipidemia    • Hypertension         Past Surgical History:   Procedure Laterality Date   • CARDIAC CATHETERIZATION     • CORONARY ARTERY BYPASS GRAFT     • HERNIA REPAIR     • KNEE ARTHROSCOPY     • PERIPHERAL ARTERIAL STENT GRAFT     • SHOULDER SURGERY Right    • SINUS SURGERY                               PT Assessment/Plan     Row Name 18 1240          PT Assessment    Assessment Comments Tx today consisted of mh and estim to knee followed by ther ex and ended with stm to lumbar region.  Pt responded well to tx with reports of decreased pain following session.  -RT        PT Plan    PT Plan Comments Will follow progressing core stability and decreased pain.  -RT       User Key  (r) = Recorded By, (t) = Taken By, (c) = Cosigned By    Initials Name Provider Type    RT Giuliano S Ulysses, PT Physical Therapist                Modalities     Row Name 18 1100             Subjective Comments    Subjective Comments Pt reports he has receive an order to treat his low back.  -RT         Subjective Pain    Able to rate subjective  pain? yes  -RT      Pre-Treatment Pain Level 6  -RT      Post-Treatment Pain Level 5  -RT         Moist Heat    MH Applied Yes  -RT      Location right knee  -RT      Rx Minutes 10 mins  -RT      MH Prior to Rx Yes  -RT         ELECTRICAL STIMULATION    Attended/Unattended Unattended  -RT      Stimulation Type IFC  -RT      Location/Electrode Placement/Other right knee  -RT      Rx Minutes 10 mins  -RT        User Key  (r) = Recorded By, (t) = Taken By, (c) = Cosigned By    Initials Name Provider Type    RT Giuliano Arora, PT Physical Therapist                Exercises     Row Name 05/02/18 1100             Subjective Comments    Subjective Comments Pt reports he has receive an order to treat his low back.  -RT         Subjective Pain    Able to rate subjective pain? yes  -RT      Pre-Treatment Pain Level 6  -RT      Post-Treatment Pain Level 5  -RT         Exercise 1    Exercise Name 1 qs 20, ap 20, ball sq 20, saq 20, laq 20, mid and low rows ytb 15  -RT      Cueing 1 Verbal;Tactile;Demo  -RT      Time 1 20 min  -RT        User Key  (r) = Recorded By, (t) = Taken By, (c) = Cosigned By    Initials Name Provider Type    RT Giuliano Arora, PT Physical Therapist                        Manual Rx (last 36 hours)      Manual Treatments     Row Name 05/02/18 1100             Manual Rx 1    Manual Rx 1 Location lumbar musculature/ paraspinals  -RT      Manual Rx 1 Type Soft tissue mobilization  -RT      Manual Rx 1 Grade as to pt's tolerance  -RT      Manual Rx 1 Duration 10 min  -RT        User Key  (r) = Recorded By, (t) = Taken By, (c) = Cosigned By    Initials Name Provider Type    RT Giuliano Arora, PT Physical Therapist                PT OP Goals     Row Name 05/02/18 1200          PT Short Term Goals    STG Date to Achieve 04/25/18  -RT     STG 1 Pt will be instructed in a HEP.  -RT     STG 1 Progress New  -RT     STG 2 Pt will report worst pain no greater than 5/10.  -RT     STG 2 Progress New  -RT     STG 3 Pt  will report pain at average to be 5/10.  -RT     STG 3 Progress Not Met  -RT     STG 4 Pt will be instructed in lifting mechanics.  -RT     STG 4 Progress New  -RT        Long Term Goals    LTG Date to Achieve 05/09/18  -RT     LTG 1 Pt will be able to stand for 45 min without increased pain.  -RT     LTG 1 Progress New  -RT     LTG 2 Pt's right knee ROM will be at least 10-110degrees.  -RT     LTG 2 Progress New  -RT     LTG 3 Pt will improve right LE strength to at least 4-/5 grossly.  -RT     LTG 3 Progress New  -RT     LTG 4 Pt will report a 50% decreased in radicular symptoms.  -RT     LTG 4 Progress New  -RT       User Key  (r) = Recorded By, (t) = Taken By, (c) = Cosigned By    Initials Name Provider Type    RT Giuliano Arora, PT Physical Therapist          Therapy Education  Given: HEP, Symptoms/condition management, Pain management, Posture/body mechanics  Program: Reinforced  How Provided: Verbal, Demonstration  Provided to: Patient  Level of Understanding: Verbalized, Demonstrated              Time Calculation:   Start Time: 0805  Stop Time: 0900  Time Calculation (min): 55 min    Therapy Charges for Today     Code Description Service Date Service Provider Modifiers Qty    87757400781 HC PT THER PROC EA 15 MIN 5/2/2018 Giuliano Arora, PT GP 1    36820652023 HC PT MANUAL THERAPY EA 15 MIN 5/2/2018 Giuliano Arora, PT GP 1    26740020168 HC PT HOT/COLD PACK WC NONMCARE 5/2/2018 Giuliano Arora, PT GP 1    71960725136 HC PT ELECTRICAL STIM UNATTENDED 5/2/2018 Giuliano Arora, PT  1                    Giuliano Arora, PT  5/2/2018

## 2018-05-09 ENCOUNTER — HOSPITAL ENCOUNTER (OUTPATIENT)
Dept: PHYSICAL THERAPY | Facility: HOSPITAL | Age: 78
Setting detail: THERAPIES SERIES
Discharge: HOME | End: 2018-05-09

## 2018-05-09 DIAGNOSIS — M25.562 CHRONIC PAIN OF BOTH KNEES: Primary | ICD-10-CM

## 2018-05-09 DIAGNOSIS — M25.561 CHRONIC PAIN OF BOTH KNEES: Primary | ICD-10-CM

## 2018-05-09 DIAGNOSIS — G89.29 CHRONIC PAIN OF BOTH KNEES: Primary | ICD-10-CM

## 2018-05-09 PROCEDURE — 97010 HOT OR COLD PACKS THERAPY: CPT | Performed by: PHYSICAL THERAPIST

## 2018-05-09 PROCEDURE — 97035 APP MDLTY 1+ULTRASOUND EA 15: CPT | Performed by: PHYSICAL THERAPIST

## 2018-05-09 PROCEDURE — 97110 THERAPEUTIC EXERCISES: CPT | Performed by: PHYSICAL THERAPIST

## 2018-05-09 PROCEDURE — G0283 ELEC STIM OTHER THAN WOUND: HCPCS | Performed by: PHYSICAL THERAPIST

## 2018-05-09 NOTE — THERAPY RE-EVALUATION
Outpatient Physical Therapy Ortho Re-Evaluation  MICKY Lacey     Patient Name: Leodan Posadas  : 1940  MRN: [id number]  Today's Date: 2018      Visit Date: 2018    Patient Active Problem List   Diagnosis   • CAD (coronary artery disease), native coronary artery   • Hyperlipidemia   • Hypertension   • PAC (premature atrial contraction)   • Medication intolerance   • Status post coronary artery stent placement   • Chest pain with moderate risk for cardiac etiology        Past Medical History:   Diagnosis Date   • Arthritis    • CAD (coronary artery disease)     s/p CABG with occluded grafts   • Carpal tunnel syndrome on both sides    • Hyperlipidemia    • Hypertension         Past Surgical History:   Procedure Laterality Date   • CARDIAC CATHETERIZATION     • CORONARY ARTERY BYPASS GRAFT     • HERNIA REPAIR     • KNEE ARTHROSCOPY     • PERIPHERAL ARTERIAL STENT GRAFT     • SHOULDER SURGERY Right    • SINUS SURGERY         Visit Dx:     ICD-10-CM ICD-9-CM   1. Chronic pain of both knees M25.561 719.46    M25.562 338.29    G89.29              Patient History     Row Name 18 0800             Pain     Pain at Present 5  -BE      Pain at Best 5  -BE      Pain at Worst 10  -BE      Tolerance Time- Standing 5 min  -BE      Tolerance Time- Sitting 10 min  -BE        User Key  (r) = Recorded By, (t) = Taken By, (c) = Cosigned By    Initials Name Provider Type    BE Madisyn Wei, PT Physical Therapist                PT Ortho     Row Name 18 0800       Myotomal Screen- Lower Quarter Clearing    Hip flexion (L2) Right:;3+ (Fair +);Left:;4 (Good)  -BE    Knee extension (L3) Right:;3 (Fair);Left:;4+ (Good +)  -BE    Knee flexion (S2) Right:;3 (Fair);Left:;4+ (Good +)  -BE       Lumbar ROM Screen- Lower Quarter Clearing    Lumbar Flexion Impaired   50%  -BE    Lumbar Extension Impaired   25%  -BE    Lumbar Rotation Impaired   Left-50%, Right-50%  -BE       General ROM    GENERAL ROM COMMENTS Right  knee: 20-98, Left knee: 0-130  -BE      User Key  (r) = Recorded By, (t) = Taken By, (c) = Cosigned By    Initials Name Provider Type    BE Madisyn Wei PT Physical Therapist                      Therapy Education  Given: HEP, Symptoms/condition management, Pain management, Posture/body mechanics  Program: Reinforced  How Provided: Verbal, Demonstration  Provided to: Patient  Level of Understanding: Verbalized, Demonstrated           PT OP Goals     Row Name 05/09/18 0800          PT Short Term Goals    STG Date to Achieve 04/25/18  -BE     STG 1 Pt will be instructed in a HEP.  -BE     STG 1 Progress Met  -BE     STG 2 Pt will report worst pain no greater than 5/10.  -BE     STG 2 Progress Ongoing  -BE     STG 2 Progress Comments 10/10 at worst  -BE     STG 3 Pt will report pain at average to be 5/10.  -BE     STG 3 Progress Not Met  -BE     STG 4 Pt will be instructed in lifting mechanics.  -BE     STG 4 Progress Met  -BE        Long Term Goals    LTG Date to Achieve 05/09/18  -BE     LTG 1 Pt will be able to stand for 45 min without increased pain.  -BE     LTG 1 Progress Ongoing  -BE     LTG 1 Progress Comments 5 min standing tolerance  -BE     LTG 2 Pt's right knee ROM will be at least 10-110degrees.  -BE     LTG 2 Progress Ongoing;Progressing  -BE     LTG 3 Pt will improve right LE strength to at least 4-/5 grossly.  -BE     LTG 3 Progress Ongoing  -BE     LTG 4 Pt will report a 50% decreased in radicular symptoms.  -BE     LTG 4 Progress Ongoing  -BE     LTG 4 Progress Comments Patient reports some improvement after therapy, but notes no long term improvement  -BE       User Key  (r) = Recorded By, (t) = Taken By, (c) = Cosigned By    Initials Name Provider Type    BE Madisyn Wei, PT Physical Therapist                PT Assessment/Plan     Row Name 05/09/18 0900          PT Assessment    Functional Limitations Performance in leisure activities;Performance in self-care ADL;Limitations in  functional capacity and performance  -BE     Impairments Pain;Balance;Gait;Range of motion;Poor body mechanics;Muscle strength;Sensation  -BE     Assessment Comments Patient has attended therapy for a total of 4 sessions, dating from 4/11/2018 to 5/9/2018.  Patient has shown slight improvement in LE strength and knee flexion ROM.  Patient continues to report elevated pain levels, but does note that he typically notices some relief in pain for approximately 1 hour after therapy sessions.  Patient will continue to benefti from skilled PT so that he can achieve his maximum level of function.  -BE     Please refer to paper survey for additional self-reported information Yes  -BE     Rehab Potential Good  -BE     Patient/caregiver participated in establishment of treatment plan and goals Yes  -BE     Patient would benefit from skilled therapy intervention Yes  -BE        PT Plan    PT Frequency 2x/week  -BE     Predicted Duration of Therapy Intervention (OT Eval) 4 weeks  -BE     Planned CPT's? PT RE-EVAL: 70290;PT THER PROC EA 15 MIN: 55044;PT THER ACT EA 15 MIN: 99547;PT MANUAL THERAPY EA 15 MIN: 41347;PT NEUROMUSC RE-EDUCATION EA 15 MIN: 28906;PT GAIT TRAINING EA 15 MIN: 65433;PT ELECTRICAL STIM UNATTEND: ;PT ULTRASOUND EA 15 MIN: 75240;PT HOT/COLD PACK WC NONMCARE: 08232;PT THER SUPP EA 15 MIN  -BE     PT Plan Comments Progress per patient's tolerance and POC.  -BE       User Key  (r) = Recorded By, (t) = Taken By, (c) = Cosigned By    Initials Name Provider Type    BE Madisyn Wei, PT Physical Therapist                Modalities     Row Name 05/09/18 0700             Subjective Comments    Subjective Comments Patient reports that he continues to notice low back and knee pain.  He reports 5/10 pain today.  He reports that he does feel like he notices decreased pain immediately following therapy session.  -BE         Moist Heat    MH Applied Yes   No redness following Mh  -BE      Location right knee,  Lumbar  -BE      Rx Minutes 15 mins  -BE      MH Prior to Rx Yes  -BE         Ice    Patient denies application of Ice No  -BE         Ultrasound 62439    Location right knee   no skin irritation following US  -BE      Rx Minutes 8min  -BE      Duty Cycle 50  -BE      Frequency --   3.3 MHz  -BE      Intensity - Wts/cm 1.2  -BE         ELECTRICAL STIMULATION    Attended/Unattended Unattended   No skin irritation following ESTIM  -BE      Stimulation Type IFC  -BE      Max mAmp --   per patient's tolerance  -BE      Location/Electrode Placement/Other right knee  -BE      Rx Minutes 15 mins  -BE        User Key  (r) = Recorded By, (t) = Taken By, (c) = Cosigned By    Initials Name Provider Type    BE Madisyn Wei, NERISSA Physical Therapist              Exercises     Row Name 05/09/18 0700             Subjective Comments    Subjective Comments Patient reports that he continues to notice low back and knee pain.  He reports 5/10 pain today.  He reports that he does feel like he notices decreased pain immediately following therapy session.  -BE         Subjective Pain    Able to rate subjective pain? yes  -BE      Pre-Treatment Pain Level 5  -BE      Post-Treatment Pain Level 5  -BE         Exercise 1    Exercise Name 1 qs 20, ap 20, ball sq 20, saq 20, mid and low rows ytb 15  -BE      Cueing 1 Verbal;Tactile;Demo  -BE      Time 1 20 min  -BE        User Key  (r) = Recorded By, (t) = Taken By, (c) = Cosigned By    Initials Name Provider Type    BE Madisyn Wei PT Physical Therapist                                  Time Calculation:   Start Time: 0805  Stop Time: 0905  Time Calculation (min): 60 min     Therapy Charges for Today     Code Description Service Date Service Provider Modifiers Qty    88157136703 HC PT ELECTRICAL STIM UNATTENDED 5/9/2018 Madisyn Wei, PT  1    69856005133 HC PT HOT/COLD PACK WC NONMCARE 5/9/2018 Madisyn Wei, PT GP 1    34523719722 HC PT THER PROC EA 15 MIN 5/9/2018  Madisyn Wei, PT GP 1    98695120139 HC PT ULTRASOUND EA 15 MIN 5/9/2018 Madisyn Wei, PT GP 1                    Madisyn Wei, PT  5/9/2018

## 2018-05-16 ENCOUNTER — HOSPITAL ENCOUNTER (OUTPATIENT)
Dept: PHYSICAL THERAPY | Facility: HOSPITAL | Age: 78
Setting detail: THERAPIES SERIES
Discharge: HOME | End: 2018-05-16

## 2018-05-16 DIAGNOSIS — M25.561 CHRONIC PAIN OF BOTH KNEES: Primary | ICD-10-CM

## 2018-05-16 DIAGNOSIS — M79.604 LUMBAR PAIN WITH RADIATION DOWN RIGHT LEG: ICD-10-CM

## 2018-05-16 DIAGNOSIS — G89.29 CHRONIC PAIN OF BOTH KNEES: Primary | ICD-10-CM

## 2018-05-16 DIAGNOSIS — M54.50 LUMBAR PAIN WITH RADIATION DOWN RIGHT LEG: ICD-10-CM

## 2018-05-16 DIAGNOSIS — M25.562 CHRONIC PAIN OF BOTH KNEES: Primary | ICD-10-CM

## 2018-05-16 DIAGNOSIS — G89.29 CHRONIC PAIN OF RIGHT KNEE: ICD-10-CM

## 2018-05-16 DIAGNOSIS — M25.561 CHRONIC PAIN OF RIGHT KNEE: ICD-10-CM

## 2018-05-16 PROCEDURE — G0283 ELEC STIM OTHER THAN WOUND: HCPCS | Performed by: PHYSICAL THERAPIST

## 2018-05-16 PROCEDURE — 97010 HOT OR COLD PACKS THERAPY: CPT | Performed by: PHYSICAL THERAPIST

## 2018-05-16 PROCEDURE — 97035 APP MDLTY 1+ULTRASOUND EA 15: CPT | Performed by: PHYSICAL THERAPIST

## 2018-05-16 PROCEDURE — 97110 THERAPEUTIC EXERCISES: CPT | Performed by: PHYSICAL THERAPIST

## 2018-05-16 NOTE — THERAPY TREATMENT NOTE
Outpatient Physical Therapy Ortho Treatment Note  MICKY Lacey     Patient Name: Leodan Posadas  : 1940  MRN: [id number]  Today's Date: 2018      Visit Date: 2018    Visit Dx:    ICD-10-CM ICD-9-CM   1. Chronic pain of both knees M25.561 719.46    M25.562 338.29    G89.29    2. Chronic pain of right knee M25.561 719.46    G89.29 338.29   3. Lumbar pain with radiation down right leg M54.5 724.2       Patient Active Problem List   Diagnosis   • CAD (coronary artery disease), native coronary artery   • Hyperlipidemia   • Hypertension   • PAC (premature atrial contraction)   • Medication intolerance   • Status post coronary artery stent placement   • Chest pain with moderate risk for cardiac etiology        Past Medical History:   Diagnosis Date   • Arthritis    • CAD (coronary artery disease)     s/p CABG with occluded grafts   • Carpal tunnel syndrome on both sides    • Hyperlipidemia    • Hypertension         Past Surgical History:   Procedure Laterality Date   • CARDIAC CATHETERIZATION     • CORONARY ARTERY BYPASS GRAFT     • HERNIA REPAIR     • KNEE ARTHROSCOPY     • PERIPHERAL ARTERIAL STENT GRAFT     • SHOULDER SURGERY Right    • SINUS SURGERY               PT Ortho     Row Name 18 0900       Subjective Comments    Subjective Comments Patient reports that his pain is 4/10 today.  He notes that he is hoping to get a knee brace soon.  -BE       Subjective Pain    Able to rate subjective pain? yes  -BE    Pre-Treatment Pain Level 4  -BE    Post-Treatment Pain Level 3  -BE      User Key  (r) = Recorded By, (t) = Taken By, (c) = Cosigned By    Initials Name Provider Type    BE Madisyn Wei PT Physical Therapist                            PT Assessment/Plan     Row Name 18 7307          PT Assessment    Assessment Comments Patient tolerated today's session well, with report of slight decrease in pain following therapy.  Session consisted of MH, ESTIM, ther ex, and US.  No adverse  reactions were noted at today's session.  Patient progressed in ther ex to include increased repetitions and increased resistance with midrows/lowrows.  Verbal and tactile cues provided throughout session to ensure correct form.  Patient will continue to be progressed per his tolerance and POC.  -BE        PT Plan    PT Plan Comments Progress per patient's tolerance and POC.  -BE       User Key  (r) = Recorded By, (t) = Taken By, (c) = Cosigned By    Initials Name Provider Type    DIONE Wei, PT Physical Therapist                Modalities     Row Name 05/16/18 0900             Moist Heat    MH Applied Yes  -BE      Location right knee, Lumbar  -BE      Rx Minutes 10 mins  -BE      MH Prior to Rx Yes  -BE         Ice    Patient denies application of Ice No  -BE         Ultrasound 45988    Location right knee   no skin irritation following US  -BE      Rx Minutes 8min  -BE      Duty Cycle 50  -BE      Frequency --   3.3 MHz  -BE      Intensity - Wts/cm 1.2  -BE         ELECTRICAL STIMULATION    Attended/Unattended Unattended   No skin irritation following ESTIM  -BE      Stimulation Type IFC  -BE      Max mAmp --   per patient's tolerance  -BE      Location/Electrode Placement/Other right knee  -BE      Rx Minutes 10 mins  -BE        User Key  (r) = Recorded By, (t) = Taken By, (c) = Cosigned By    Initials Name Provider Type    DIONE Wei, PT Physical Therapist                Exercises     Row Name 05/16/18 0900             Subjective Comments    Subjective Comments Patient reports that his pain is 4/10 today.  He notes that he is hoping to get a knee brace soon.  -BE         Subjective Pain    Able to rate subjective pain? yes  -BE      Pre-Treatment Pain Level 4  -BE      Post-Treatment Pain Level 3  -BE         Exercise 1    Exercise Name 1 qs 30, ap 30, ball sq 30, saq 30, LAQ 2x10, mid and low rows RTB 15x, Scap squeeze 2x10  -BE      Cueing 1 Verbal;Tactile;Demo  -BE      Time 1 30 min   -BE        User Key  (r) = Recorded By, (t) = Taken By, (c) = Cosigned By    Initials Name Provider Type    BE Madisyn Wei, PT Physical Therapist                             Therapy Education  Given: HEP, Symptoms/condition management, Pain management, Posture/body mechanics  Program: Reinforced  How Provided: Verbal, Demonstration  Provided to: Patient  Level of Understanding: Verbalized, Demonstrated              Time Calculation:   Start Time: 0808  Stop Time: 0904  Time Calculation (min): 56 min    Therapy Charges for Today     Code Description Service Date Service Provider Modifiers Qty    48956915569 HC PT ELECTRICAL STIM UNATTENDED 5/16/2018 Madisyn Wei, PT  1    22491417176 HC PT HOT/COLD PACK WC NONMCARE 5/16/2018 Madisyn Wei, PT GP 1    83783253744 HC PT THER PROC EA 15 MIN 5/16/2018 Madisyn Wei, PT GP 2    48342090440 HC PT ULTRASOUND EA 15 MIN 5/16/2018 Madisyn Wei, PT GP 1                    Madisyn Wei, PT  5/16/2018

## 2018-05-23 ENCOUNTER — HOSPITAL ENCOUNTER (OUTPATIENT)
Dept: PHYSICAL THERAPY | Facility: HOSPITAL | Age: 78
Setting detail: THERAPIES SERIES
Discharge: HOME | End: 2018-05-23

## 2018-05-23 DIAGNOSIS — M54.50 LUMBAR PAIN WITH RADIATION DOWN RIGHT LEG: ICD-10-CM

## 2018-05-23 DIAGNOSIS — M79.604 LUMBAR PAIN WITH RADIATION DOWN RIGHT LEG: ICD-10-CM

## 2018-05-23 DIAGNOSIS — G89.29 CHRONIC PAIN OF RIGHT KNEE: ICD-10-CM

## 2018-05-23 DIAGNOSIS — M25.561 CHRONIC PAIN OF BOTH KNEES: Primary | ICD-10-CM

## 2018-05-23 DIAGNOSIS — M25.562 CHRONIC PAIN OF BOTH KNEES: Primary | ICD-10-CM

## 2018-05-23 DIAGNOSIS — G89.29 CHRONIC PAIN OF BOTH KNEES: Primary | ICD-10-CM

## 2018-05-23 DIAGNOSIS — M25.561 CHRONIC PAIN OF RIGHT KNEE: ICD-10-CM

## 2018-05-23 PROCEDURE — 97010 HOT OR COLD PACKS THERAPY: CPT

## 2018-05-23 PROCEDURE — 97110 THERAPEUTIC EXERCISES: CPT

## 2018-05-23 PROCEDURE — 97035 APP MDLTY 1+ULTRASOUND EA 15: CPT

## 2018-05-23 PROCEDURE — G0283 ELEC STIM OTHER THAN WOUND: HCPCS

## 2018-05-23 NOTE — THERAPY TREATMENT NOTE
Outpatient Physical Therapy Ortho Treatment Note  MICKY Lacey     Patient Name: Leodan Posadas  : 1940  MRN: [id number]  Today's Date: 2018      Visit Date: 2018    Visit Dx:    ICD-10-CM ICD-9-CM   1. Chronic pain of both knees M25.561 719.46    M25.562 338.29    G89.29    2. Chronic pain of right knee M25.561 719.46    G89.29 338.29   3. Lumbar pain with radiation down right leg M54.5 724.2       Patient Active Problem List   Diagnosis   • CAD (coronary artery disease), native coronary artery   • Hyperlipidemia   • Hypertension   • PAC (premature atrial contraction)   • Medication intolerance   • Status post coronary artery stent placement   • Chest pain with moderate risk for cardiac etiology        Past Medical History:   Diagnosis Date   • Arthritis    • CAD (coronary artery disease)     s/p CABG with occluded grafts   • Carpal tunnel syndrome on both sides    • Hyperlipidemia    • Hypertension         Past Surgical History:   Procedure Laterality Date   • CARDIAC CATHETERIZATION     • CORONARY ARTERY BYPASS GRAFT     • HERNIA REPAIR     • KNEE ARTHROSCOPY     • PERIPHERAL ARTERIAL STENT GRAFT     • SHOULDER SURGERY Right    • SINUS SURGERY               PT Ortho     Row Name 18 0900       Subjective Comments    Subjective Comments Patient arrives to therapy w/ reports of 6/10 R) knee and low back pain.  Pt states he feels his pain is increased today due to changes in the weather.   -KATALINA       Subjective Pain    Able to rate subjective pain? yes  -KATALINA    Pre-Treatment Pain Level 6  -KATALINA    Post-Treatment Pain Level 5  -KATALINA      User Key  (r) = Recorded By, (t) = Taken By, (c) = Cosigned By    Initials Name Provider Type    KATALINA Hogan PTA Physical Therapy Assistant                            PT Assessment/Plan     Row Name 18 1023          PT Assessment    Assessment Comments Patient completed today's session w/ reports of slight decrease in pain, 5/10.  Pt limited with  progress of LE therex secondary to c/o R) knee pain.  Pt initiated tx w/ MH and Estim to bilateral knees, MH to low back for pain control f/b therex as listed.  Treatment concluded with pulsed US to R) knee.  Pt received cues during tband postural strengthening activities for improved feedback and for max benefit w/ activities.  Pt continues to progress as tolerated.  No adverse reactions observed following modalities.    -KATALINA        PT Plan    PT Plan Comments Continue with PT's POC and progress tx as tolerated by patient.   -KATALINA       User Key  (r) = Recorded By, (t) = Taken By, (c) = Cosigned By    Initials Name Provider Type    KATALINA Hogan PTA Physical Therapy Assistant                Modalities     Row Name 05/23/18 0900             Moist Heat    MH Applied Yes   no redness observed following MH  -KATALINA      Location right knee, Lumbar  -KATALINA      Rx Minutes 10 mins  -KATALINA      MH Prior to Rx Yes   w/ estim to R) knee in supine position  -KATALINA         Ice    Patient denies application of Ice Yes  -KATALINA         Ultrasound 11232    Location right knee  -KATALINA      Rx Minutes 8min  -KATALINA      Duty Cycle 50  -KATALINA      Frequency --   3.3MHz  -KATALINA      Intensity - Wts/cm 1.2  -KATALINA         ELECTRICAL STIMULATION    Attended/Unattended Unattended   no skin irritation observed following estim  -KATALINA      Stimulation Type IFC  -KATALINA      Max mAmp --   as to pt's tolerance  -KATALINA      Location/Electrode Placement/Other right knee  -KATALINA      Rx Minutes 10 mins  -KATALINA        User Key  (r) = Recorded By, (t) = Taken By, (c) = Cosigned By    Initials Name Provider Type    KATALINA Hogan PTA Physical Therapy Assistant                Exercises     Row Name 05/23/18 0900             Subjective Comments    Subjective Comments Patient arrives to therapy w/ reports of 6/10 R) knee and low back pain.  Pt states he feels his pain is increased today due to changes in the weather.   -KATALINA         Subjective Pain    Able to rate subjective  pain? yes  -KATALINA      Pre-Treatment Pain Level 6  -KATALINA      Post-Treatment Pain Level 5  -KATALINA         Exercise 1    Exercise Name 1 QS 10x2, AP 15x2, ball squeeze 10x3, SAQ 10x3, LAQ 10x2, hip abd w/tband (red) 10x3, midrow w/ tband (red) x15, lowrow w/ tband (red) 10x2  -KATALINA      Cueing 1 Verbal;Tactile;Demo  -KATALINA      Time 1 30 min  -KATALINA        User Key  (r) = Recorded By, (t) = Taken By, (c) = Cosigned By    Initials Name Provider Type    KATALINA Estelle Hogan PTA Physical Therapy Assistant                             Therapy Education  Given: Symptoms/condition management, Pain management, Posture/body mechanics  Program: Reinforced  How Provided: Verbal, Demonstration  Provided to: Patient  Level of Understanding: Verbalized, Demonstrated              Time Calculation:   Start Time: 0805  Stop Time: 0901  Time Calculation (min): 56 min    Therapy Charges for Today     Code Description Service Date Service Provider Modifiers Qty    82780818211 HC PT THER PROC EA 15 MIN 5/23/2018 Estelle Hogan PTA GP 2    54478713070 HC PT ULTRASOUND EA 15 MIN 5/23/2018 Estelle Hogan PTA GP 1    59967878015 HC PT ELECTRICAL STIM UNATTENDED 5/23/2018 Estelle Hogan PTA  1    91748202074 HC PT HOT/COLD PACK WC NONMCARE 5/23/2018 Estelle Hogan PTA GP 1                    Estelle Hogan PTA  5/23/2018

## 2018-05-30 ENCOUNTER — HOSPITAL ENCOUNTER (OUTPATIENT)
Dept: PHYSICAL THERAPY | Facility: HOSPITAL | Age: 78
Setting detail: THERAPIES SERIES
Discharge: HOME | End: 2018-05-30

## 2018-05-30 DIAGNOSIS — M25.561 CHRONIC PAIN OF BOTH KNEES: Primary | ICD-10-CM

## 2018-05-30 DIAGNOSIS — M79.604 LUMBAR PAIN WITH RADIATION DOWN RIGHT LEG: ICD-10-CM

## 2018-05-30 DIAGNOSIS — M54.50 LUMBAR PAIN WITH RADIATION DOWN RIGHT LEG: ICD-10-CM

## 2018-05-30 DIAGNOSIS — G89.29 CHRONIC PAIN OF RIGHT KNEE: ICD-10-CM

## 2018-05-30 DIAGNOSIS — M25.561 CHRONIC PAIN OF RIGHT KNEE: ICD-10-CM

## 2018-05-30 DIAGNOSIS — M25.562 CHRONIC PAIN OF BOTH KNEES: Primary | ICD-10-CM

## 2018-05-30 DIAGNOSIS — G89.29 CHRONIC PAIN OF BOTH KNEES: Primary | ICD-10-CM

## 2018-05-30 PROCEDURE — 97010 HOT OR COLD PACKS THERAPY: CPT

## 2018-05-30 PROCEDURE — 97110 THERAPEUTIC EXERCISES: CPT

## 2018-05-30 PROCEDURE — 97035 APP MDLTY 1+ULTRASOUND EA 15: CPT

## 2018-05-30 PROCEDURE — G0283 ELEC STIM OTHER THAN WOUND: HCPCS

## 2018-05-30 NOTE — THERAPY TREATMENT NOTE
Outpatient Physical Therapy Ortho Treatment Note  MICKY Lacey     Patient Name: Leodan Posadas  : 1940  MRN: [id number]  Today's Date: 2018      Visit Date: 2018    Visit Dx:    ICD-10-CM ICD-9-CM   1. Chronic pain of both knees M25.561 719.46    M25.562 338.29    G89.29    2. Chronic pain of right knee M25.561 719.46    G89.29 338.29   3. Lumbar pain with radiation down right leg M54.5 724.2       Patient Active Problem List   Diagnosis   • CAD (coronary artery disease), native coronary artery   • Hyperlipidemia   • Hypertension   • PAC (premature atrial contraction)   • Medication intolerance   • Status post coronary artery stent placement   • Chest pain with moderate risk for cardiac etiology        Past Medical History:   Diagnosis Date   • Arthritis    • CAD (coronary artery disease)     s/p CABG with occluded grafts   • Carpal tunnel syndrome on both sides    • Hyperlipidemia    • Hypertension         Past Surgical History:   Procedure Laterality Date   • CARDIAC CATHETERIZATION     • CORONARY ARTERY BYPASS GRAFT     • HERNIA REPAIR     • KNEE ARTHROSCOPY     • PERIPHERAL ARTERIAL STENT GRAFT     • SHOULDER SURGERY Right    • SINUS SURGERY               PT Ortho     Row Name 18 0800       Subjective Comments    Subjective Comments Patient reports difficulty sleeping last night due to R) knee pain.  Pt reports 8/10 pain prior to tx.   -KATALINA       Subjective Pain    Able to rate subjective pain? yes  -KATALINA    Pre-Treatment Pain Level 8  -KATALINA    Post-Treatment Pain Level 7   6-7/10  -KATALINA      User Key  (r) = Recorded By, (t) = Taken By, (c) = Cosigned By    Initials Name Provider Type    KATALINA Hogan PTA Physical Therapy Assistant                            PT Assessment/Plan     Row Name 18 0907          PT Assessment    Assessment Comments Patient arrived to therapy w/ reports of difficulty sleeping last night secondary to increased R) knee pain.  Pt began today's session w/  MH and Estim to R) knee, MH to low back in supine position for pain control f/b therex as listed. Pt limited w/ progression of therex secondary to c/o R) knee pain.  Pt educated to perform activities to his tolerance.  Treatment concluded with pulsed US to R) knee.  Pt continues to progress as tolerated.  No adverse reactions observed following modalities.   -KATALINA        PT Plan    PT Plan Comments Continue with PT's POC and will progress tx as tolerated by patient.   -KATALINA       User Key  (r) = Recorded By, (t) = Taken By, (c) = Cosigned By    Initials Name Provider Type    KATALINA Hogan PTA Physical Therapy Assistant                Modalities     Row Name 05/30/18 0800             Moist Heat    MH Applied Yes   no redness observed following MH  -KATALINA      Location right knee, Lumbar  -KATALINA      Rx Minutes 15 mins  -KATALINA      MH Prior to Rx Yes   w/ estim to R) knee in supine position  -KATALINA         Ice    Patient denies application of Ice Yes  -KATALINA         Ultrasound [id number]    Location right knee  -KATALINA      Rx Minutes 8min  -KATALINA      Duty Cycle 50  -KATALINA      Frequency --   3.3MHz  -KATALINA      Intensity - Wts/cm 1.2  -KATALINA         ELECTRICAL STIMULATION    Attended/Unattended Unattended   no skin irritation observed following estim  -KATALINA      Stimulation Type IFC  -KATALINA      Max mAmp --   as to pt's tolerance  -KATALINA      Location/Electrode Placement/Other right knee  -KATALINA      Rx Minutes 15 mins  -KATALINA        User Key  (r) = Recorded By, (t) = Taken By, (c) = Cosigned By    Initials Name Provider Type    KATALINA Hogan PTA Physical Therapy Assistant                Exercises     Row Name 05/30/18 0800             Subjective Comments    Subjective Comments Patient reports difficulty sleeping last night due to R) knee pain.  Pt reports 8/10 pain prior to tx.   -KATALINA         Subjective Pain    Able to rate subjective pain? yes  -KATALINA      Pre-Treatment Pain Level 8  -KATALINA      Post-Treatment Pain Level 7   6-7/10  -KATALINA          Exercise 1    Exercise Name 1 QS 15x2, AP 15x2, ball squeeze 15x2, SAQ 15x2, hip abd w/ tband (red) 10x2, LAQ 10x2, midrow w/tband (red) 10x2, low row w/tband (red) 10x2  -KATALINA      Cueing 1 Verbal;Tactile;Demo  -KATALINA      Time 1 30 min  -KATALINA        User Key  (r) = Recorded By, (t) = Taken By, (c) = Cosigned By    Initials Name Provider Type    KATALINA Estelle Hogan PTA Physical Therapy Assistant                                            Time Calculation:   Start Time: 0805  Stop Time: 0900  Time Calculation (min): 55 min    Therapy Charges for Today     Code Description Service Date Service Provider Modifiers Qty    68353367045 HC PT THER PROC EA 15 MIN 5/30/2018 Estelle Hogan PTA GP 2    76733442556 HC PT ULTRASOUND EA 15 MIN 5/30/2018 Estelle Hogan PTA GP 1    84546178958 HC PT HOT/COLD PACK WC NONMCARE 5/30/2018 Estelle Hogan PTA GP 1    12856118003 HC PT ELECTRICAL STIM UNATTENDED 5/30/2018 Estelle Hogan PTA  1                    Estelle Hogan PTA  5/30/2018

## 2018-06-13 ENCOUNTER — APPOINTMENT (OUTPATIENT)
Dept: PHYSICAL THERAPY | Facility: HOSPITAL | Age: 78
End: 2018-06-13

## 2018-06-20 ENCOUNTER — APPOINTMENT (OUTPATIENT)
Dept: PHYSICAL THERAPY | Facility: HOSPITAL | Age: 78
End: 2018-06-20

## 2018-11-16 ENCOUNTER — EPISODE CHANGES (OUTPATIENT)
Dept: CASE MANAGEMENT | Facility: OTHER | Age: 78
End: 2018-11-16

## 2019-03-22 ENCOUNTER — HOSPITAL ENCOUNTER (EMERGENCY)
Facility: HOSPITAL | Age: 79
Discharge: HOME | End: 2019-03-22
Attending: EMERGENCY MEDICINE | Admitting: EMERGENCY MEDICINE

## 2019-03-22 ENCOUNTER — APPOINTMENT (OUTPATIENT)
Dept: CT IMAGING | Facility: HOSPITAL | Age: 79
End: 2019-03-22

## 2019-03-22 ENCOUNTER — APPOINTMENT (OUTPATIENT)
Dept: GENERAL RADIOLOGY | Facility: HOSPITAL | Age: 79
End: 2019-03-22

## 2019-03-22 VITALS
DIASTOLIC BLOOD PRESSURE: 76 MMHG | TEMPERATURE: 97.9 F | HEART RATE: 89 BPM | WEIGHT: 184 LBS | OXYGEN SATURATION: 100 % | HEIGHT: 68 IN | SYSTOLIC BLOOD PRESSURE: 135 MMHG | RESPIRATION RATE: 18 BRPM | BODY MASS INDEX: 27.89 KG/M2

## 2019-03-22 DIAGNOSIS — M54.6 ACUTE BILATERAL THORACIC BACK PAIN: Primary | ICD-10-CM

## 2019-03-22 DIAGNOSIS — K80.20 CALCULUS OF GALLBLADDER WITHOUT CHOLECYSTITIS WITHOUT OBSTRUCTION: ICD-10-CM

## 2019-03-22 LAB
ALBUMIN SERPL-MCNC: 4.7 G/DL (ref 3.5–5.2)
ALBUMIN/GLOB SERPL: 1.7 G/DL
ALP SERPL-CCNC: 73 U/L (ref 39–117)
ALT SERPL W P-5'-P-CCNC: 8 U/L (ref 1–41)
ANION GAP SERPL CALCULATED.3IONS-SCNC: 11.3 MMOL/L
AST SERPL-CCNC: 14 U/L (ref 1–40)
BASOPHILS # BLD AUTO: 0.02 10*3/MM3 (ref 0–0.2)
BASOPHILS NFR BLD AUTO: 0.2 % (ref 0–1.5)
BILIRUB SERPL-MCNC: 0.6 MG/DL (ref 0.2–1.2)
BUN BLD-MCNC: 19 MG/DL (ref 8–23)
BUN/CREAT SERPL: 16.1 (ref 7–25)
CALCIUM SPEC-SCNC: 9.4 MG/DL (ref 8.6–10.5)
CHLORIDE SERPL-SCNC: 97 MMOL/L (ref 98–107)
CO2 SERPL-SCNC: 27.7 MMOL/L (ref 22–29)
CREAT BLD-MCNC: 1.18 MG/DL (ref 0.76–1.27)
DEPRECATED RDW RBC AUTO: 48.9 FL (ref 37–54)
EOSINOPHIL # BLD AUTO: 0.14 10*3/MM3 (ref 0–0.4)
EOSINOPHIL NFR BLD AUTO: 1.2 % (ref 0.3–6.2)
ERYTHROCYTE [DISTWIDTH] IN BLOOD BY AUTOMATED COUNT: 15 % (ref 12.3–15.4)
GFR SERPL CREATININE-BSD FRML MDRD: 60 ML/MIN/1.73
GLOBULIN UR ELPH-MCNC: 2.8 GM/DL
GLUCOSE BLD-MCNC: 105 MG/DL (ref 65–99)
HCT VFR BLD AUTO: 43.6 % (ref 37.5–51)
HGB BLD-MCNC: 14.5 G/DL (ref 13–17.7)
HOLD SPECIMEN: NORMAL
HOLD SPECIMEN: NORMAL
IMM GRANULOCYTES # BLD AUTO: 0.03 10*3/MM3 (ref 0–0.05)
IMM GRANULOCYTES NFR BLD AUTO: 0.3 % (ref 0–0.5)
LYMPHOCYTES # BLD AUTO: 3.01 10*3/MM3 (ref 0.7–3.1)
LYMPHOCYTES NFR BLD AUTO: 26.2 % (ref 19.6–45.3)
MCH RBC QN AUTO: 30.3 PG (ref 26.6–33)
MCHC RBC AUTO-ENTMCNC: 33.3 G/DL (ref 31.5–35.7)
MCV RBC AUTO: 91 FL (ref 79–97)
MONOCYTES # BLD AUTO: 1.4 10*3/MM3 (ref 0.1–0.9)
MONOCYTES NFR BLD AUTO: 12.2 % (ref 5–12)
NEUTROPHILS # BLD AUTO: 6.89 10*3/MM3 (ref 1.4–7)
NEUTROPHILS NFR BLD AUTO: 59.9 % (ref 42.7–76)
PLATELET # BLD AUTO: 165 10*3/MM3 (ref 140–450)
PMV BLD AUTO: 11.3 FL (ref 6–12)
POTASSIUM BLD-SCNC: 4.3 MMOL/L (ref 3.5–5.2)
PROT SERPL-MCNC: 7.5 G/DL (ref 6–8.5)
RBC # BLD AUTO: 4.79 10*6/MM3 (ref 4.14–5.8)
SODIUM BLD-SCNC: 136 MMOL/L (ref 136–145)
TROPONIN T SERPL-MCNC: <0.01 NG/ML (ref 0–0.03)
WBC NRBC COR # BLD: 11.49 10*3/MM3 (ref 3.4–10.8)
WHOLE BLOOD HOLD SPECIMEN: NORMAL
WHOLE BLOOD HOLD SPECIMEN: NORMAL

## 2019-03-22 PROCEDURE — 71275 CT ANGIOGRAPHY CHEST: CPT | Performed by: RADIOLOGY

## 2019-03-22 PROCEDURE — 71275 CT ANGIOGRAPHY CHEST: CPT

## 2019-03-22 PROCEDURE — 0 IOPAMIDOL PER 1 ML: Performed by: EMERGENCY MEDICINE

## 2019-03-22 PROCEDURE — 71045 X-RAY EXAM CHEST 1 VIEW: CPT | Performed by: RADIOLOGY

## 2019-03-22 PROCEDURE — 93010 ELECTROCARDIOGRAM REPORT: CPT | Performed by: INTERNAL MEDICINE

## 2019-03-22 PROCEDURE — 93005 ELECTROCARDIOGRAM TRACING: CPT | Performed by: EMERGENCY MEDICINE

## 2019-03-22 PROCEDURE — 25010000002 KETOROLAC TROMETHAMINE PER 15 MG: Performed by: EMERGENCY MEDICINE

## 2019-03-22 PROCEDURE — 96374 THER/PROPH/DIAG INJ IV PUSH: CPT

## 2019-03-22 PROCEDURE — 84484 ASSAY OF TROPONIN QUANT: CPT | Performed by: EMERGENCY MEDICINE

## 2019-03-22 PROCEDURE — 25010000002 BUTORPHANOL PER 1 MG: Performed by: EMERGENCY MEDICINE

## 2019-03-22 PROCEDURE — 99284 EMERGENCY DEPT VISIT MOD MDM: CPT

## 2019-03-22 PROCEDURE — 71045 X-RAY EXAM CHEST 1 VIEW: CPT

## 2019-03-22 PROCEDURE — 99285 EMERGENCY DEPT VISIT HI MDM: CPT

## 2019-03-22 PROCEDURE — 80053 COMPREHEN METABOLIC PANEL: CPT | Performed by: EMERGENCY MEDICINE

## 2019-03-22 PROCEDURE — 96372 THER/PROPH/DIAG INJ SC/IM: CPT

## 2019-03-22 PROCEDURE — 85025 COMPLETE CBC W/AUTO DIFF WBC: CPT | Performed by: EMERGENCY MEDICINE

## 2019-03-22 RX ORDER — NAPROXEN 500 MG/1
500 TABLET ORAL 2 TIMES DAILY WITH MEALS
Qty: 30 TABLET | Refills: 0 | Status: SHIPPED | OUTPATIENT
Start: 2019-03-22

## 2019-03-22 RX ORDER — SODIUM CHLORIDE 0.9 % (FLUSH) 0.9 %
10 SYRINGE (ML) INJECTION AS NEEDED
Status: DISCONTINUED | OUTPATIENT
Start: 2019-03-22 | End: 2019-03-22 | Stop reason: HOSPADM

## 2019-03-22 RX ORDER — ASPIRIN 325 MG
325 TABLET ORAL ONCE
Status: DISCONTINUED | OUTPATIENT
Start: 2019-03-22 | End: 2019-03-22 | Stop reason: HOSPADM

## 2019-03-22 RX ORDER — CYCLOBENZAPRINE HCL 10 MG
10 TABLET ORAL 3 TIMES DAILY
Qty: 12 TABLET | Refills: 0 | Status: SHIPPED | OUTPATIENT
Start: 2019-03-22

## 2019-03-22 RX ORDER — ASPIRIN 81 MG/1
324 TABLET, CHEWABLE ORAL ONCE
Status: DISCONTINUED | OUTPATIENT
Start: 2019-03-22 | End: 2019-03-22

## 2019-03-22 RX ORDER — RANOLAZINE 500 MG/1
500 TABLET, EXTENDED RELEASE ORAL 2 TIMES DAILY
COMMUNITY

## 2019-03-22 RX ORDER — KETOROLAC TROMETHAMINE 30 MG/ML
15 INJECTION, SOLUTION INTRAMUSCULAR; INTRAVENOUS ONCE
Status: COMPLETED | OUTPATIENT
Start: 2019-03-22 | End: 2019-03-22

## 2019-03-22 RX ADMIN — KETOROLAC TROMETHAMINE 15 MG: 30 INJECTION, SOLUTION INTRAMUSCULAR; INTRAVENOUS at 17:12

## 2019-03-22 RX ADMIN — IOPAMIDOL 90 ML: 755 INJECTION, SOLUTION INTRAVENOUS at 16:42

## 2019-03-22 RX ADMIN — BUTORPHANOL TARTRATE 1 MG: 2 INJECTION, SOLUTION INTRAMUSCULAR; INTRAVENOUS at 18:01

## 2019-03-22 NOTE — ED PROVIDER NOTES
Subjective   79-year-old white male here for chest pain.  Patient states that he has had pain in his back between his shoulder blades over the past 3-4 days.  This radiates to both arms and down to his lower back.  He also complains of anterior chest pain which is poorly characterized.  He did denied any injury or trauma.  He is not had any shortness of breath, nausea, diaphoresis.  He has a previous history of coronary artery disease status post CABG and subsequent stents, with the last being about 6 months ago.            Review of Systems   All other systems reviewed and are negative.      Past Medical History:   Diagnosis Date   • Arthritis    • CAD (coronary artery disease)     s/p CABG with occluded grafts   • Carpal tunnel syndrome on both sides    • Hyperlipidemia    • Hypertension    • Myocardial infarction (CMS/HCC)        Allergies   Allergen Reactions   • Penicillins Hives   • Codeine Hallucinations   • Percocet [Oxycodone-Acetaminophen] Other (See Comments)     Increases blood pressure   • Percocet [Oxycodone-Acetaminophen] Other (See Comments)     HIGH BP   • Pravastatin Sodium      Felt as if he couldn't walk.    • Valium [Diazepam]      Patient states it makes him crazy.       Past Surgical History:   Procedure Laterality Date   • CARDIAC CATHETERIZATION     • CORONARY ARTERY BYPASS GRAFT     • HERNIA REPAIR     • KNEE ARTHROSCOPY     • PERIPHERAL ARTERIAL STENT GRAFT     • SHOULDER SURGERY Right    • SINUS SURGERY         History reviewed. No pertinent family history.    Social History     Socioeconomic History   • Marital status:      Spouse name: Not on file   • Number of children: Not on file   • Years of education: Not on file   • Highest education level: Not on file   Tobacco Use   • Smoking status: Former Smoker     Packs/day: 3.00     Types: Cigarettes     Last attempt to quit: 1981     Years since quittin.8   • Smokeless tobacco: Never Used   Substance and Sexual Activity   •  Alcohol use: No   • Drug use: No   • Sexual activity: Defer           Objective   Physical Exam   Constitutional: He is oriented to person, place, and time. He appears well-developed and well-nourished.   HENT:   Head: Normocephalic and atraumatic.   Neck: Normal range of motion. Neck supple.   Cardiovascular: Normal rate and regular rhythm. Exam reveals no gallop and no friction rub.   No murmur heard.  Pulses:       Dorsalis pedis pulses are 3+ on the right side, and 1+ on the left side.   Pulmonary/Chest: Effort normal and breath sounds normal. He has no decreased breath sounds. He has no wheezes. He has no rhonchi. He has no rales.   Abdominal: Soft. Bowel sounds are normal. He exhibits no distension. There is no tenderness.   Musculoskeletal:        Right lower leg: Normal. He exhibits no edema.        Left lower leg: Normal. He exhibits no edema.   Neurological: He is alert and oriented to person, place, and time.   Skin: Skin is warm and dry.   Psychiatric: He has a normal mood and affect. His behavior is normal.   Nursing note and vitals reviewed.      Procedures  Results for orders placed or performed during the hospital encounter of 03/22/19   Comprehensive Metabolic Panel   Result Value Ref Range    Glucose 105 (H) 65 - 99 mg/dL    BUN 19 8 - 23 mg/dL    Creatinine 1.18 0.76 - 1.27 mg/dL    Sodium 136 136 - 145 mmol/L    Potassium 4.3 3.5 - 5.2 mmol/L    Chloride 97 (L) 98 - 107 mmol/L    CO2 27.7 22.0 - 29.0 mmol/L    Calcium 9.4 8.6 - 10.5 mg/dL    Total Protein 7.5 6.0 - 8.5 g/dL    Albumin 4.70 3.50 - 5.20 g/dL    ALT (SGPT) 8 1 - 41 U/L    AST (SGOT) 14 1 - 40 U/L    Alkaline Phosphatase 73 39 - 117 U/L    Total Bilirubin 0.6 0.2 - 1.2 mg/dL    eGFR Non African Amer 60 (L) >60 mL/min/1.73    Globulin 2.8 gm/dL    A/G Ratio 1.7 g/dL    BUN/Creatinine Ratio 16.1 7.0 - 25.0    Anion Gap 11.3 mmol/L   Troponin   Result Value Ref Range    Troponin T <0.010 0.000 - 0.030 ng/mL   CBC Auto Differential    Result Value Ref Range    WBC 11.49 (H) 3.40 - 10.80 10*3/mm3    RBC 4.79 4.14 - 5.80 10*6/mm3    Hemoglobin 14.5 13.0 - 17.7 g/dL    Hematocrit 43.6 37.5 - 51.0 %    MCV 91.0 79.0 - 97.0 fL    MCH 30.3 26.6 - 33.0 pg    MCHC 33.3 31.5 - 35.7 g/dL    RDW 15.0 12.3 - 15.4 %    RDW-SD 48.9 37.0 - 54.0 fl    MPV 11.3 6.0 - 12.0 fL    Platelets 165 140 - 450 10*3/mm3    Neutrophil % 59.9 42.7 - 76.0 %    Lymphocyte % 26.2 19.6 - 45.3 %    Monocyte % 12.2 (H) 5.0 - 12.0 %    Eosinophil % 1.2 0.3 - 6.2 %    Basophil % 0.2 0.0 - 1.5 %    Immature Grans % 0.3 0.0 - 0.5 %    Neutrophils, Absolute 6.89 1.40 - 7.00 10*3/mm3    Lymphocytes, Absolute 3.01 0.70 - 3.10 10*3/mm3    Monocytes, Absolute 1.40 (H) 0.10 - 0.90 10*3/mm3    Eosinophils, Absolute 0.14 0.00 - 0.40 10*3/mm3    Basophils, Absolute 0.02 0.00 - 0.20 10*3/mm3    Immature Grans, Absolute 0.03 0.00 - 0.05 10*3/mm3   Light Blue Top   Result Value Ref Range    Extra Tube hold for add-on    Green Top (Gel)   Result Value Ref Range    Extra Tube Hold for add-ons.    Lavender Top   Result Value Ref Range    Extra Tube hold for add-on    Gold Top - SST   Result Value Ref Range    Extra Tube Hold for add-ons.      Xr Chest 1 View    Result Date: 3/22/2019  Narrative: XR CHEST 1 VW-  CLINICAL INDICATION: Chest Pain Triage Protocol   COMPARISON: 08/27/2017  TECHNIQUE: Single frontal view of the chest.  FINDINGS:  There is no focal alveolar infiltrate or effusion. The cardiac silhouette is normal. The pulmonary vasculature is unremarkable. There is no evidence of an acute osseous abnormality. There are no suspicious-appearing parenchymal soft tissue nodules.       Impression: No evidence of active or acute cardiopulmonary disease on today's chest radiograph.  This report was finalized on 3/22/2019 4:08 PM by Dr. Devante Quintana MD.      Ct Angiogram Chest With Contrast    Result Date: 3/22/2019  Narrative: CT ANGIOGRAM CHEST W CONTRAST-  CLINICAL INDICATION: Back pain    COMPARISON: Chest radiograph 03/22/2019  PROCEDURE: Thin cut axial images were acquired through the pulmonary vessels during the rapid infusion of IV contrast.  Additional 3-D reformatted images obtained via post-processing for improved diagnostic accuracy and procedural planning.  Radiation dose reduction techniques were utilized per ALARA protocol. Automated exposure control was initiated through either or [employer] or [employer] software packages by  protocol.     FINDINGS: Today's study demonstrates opacification of the central pulmonary vessels.  There are no filling defects. There is no truncation.  No evidence of a pulmonary embolus.  Otherwise there are no parenchymal soft tissue nodules or masses.  There is no mediastinal lymph node enlargement  No pericardial or pleural effusions  Moderate coronary artery calcifications are present  The upper abdomen shows the gallbladder to be distended and there is cholelithiasis      Impression: 1. No evidence of a pulmonary embolus. 2. Distention of the gallbladder and cholelithiasis 3. Coronary artery calcifications 4. Arthritic change in the T-spine.  This report was finalized on 3/22/2019 4:46 PM by Dr. Devante Quintana MD.               ED Course  ED Course as of Mar 22 1749   Fri Mar 22, 2019   1743 Unremarkable at this time.  Most consistent with arthritis/DJD.  Will continue home meds, add NSAIDs and muscle relaxer.  Patient does have some gallstones noted but this is not correlate clinically with his symptoms.  His abdomen is nontender.  He states that he knows that he has gallstones and he has been following with Dr. Yaneli Mortensen for this.  [BC]      ED Course User Index  [BC] Jose Juan Batista MD                  MDM  Number of Diagnoses or Management Options  Acute bilateral thoracic back pain:   Calculus of gallbladder without cholecystitis without obstruction:      Amount and/or Complexity of Data Reviewed  Clinical lab tests: reviewed  Tests in the  radiology section of CPT®: reviewed  Tests in the medicine section of CPT®: reviewed    Risk of Complications, Morbidity, and/or Mortality  Presenting problems: high  Diagnostic procedures: high  Management options: moderate          Final diagnoses:   Acute bilateral thoracic back pain   Calculus of gallbladder without cholecystitis without obstruction            Jose Juan Batista MD  03/22/19 6683

## 2021-12-21 ENCOUNTER — HOSPITAL ENCOUNTER (OUTPATIENT)
Dept: INFUSION THERAPY | Facility: HOSPITAL | Age: 81
Discharge: HOME | End: 2021-12-21

## 2021-12-21 VITALS
RESPIRATION RATE: 18 BRPM | DIASTOLIC BLOOD PRESSURE: 74 MMHG | OXYGEN SATURATION: 96 % | HEART RATE: 80 BPM | TEMPERATURE: 99.9 F | SYSTOLIC BLOOD PRESSURE: 138 MMHG

## 2021-12-21 DIAGNOSIS — U07.1 COVID-19: Primary | ICD-10-CM

## 2021-12-21 PROCEDURE — M0245 HC IV INFUSION, BAMLANIVIMAB AND ETESEVIMAB, 2100 MG: HCPCS

## 2021-12-21 PROCEDURE — 25010000002 INJECTION, BAMLANIVIMAB AND ETESEVIMAB, 2100 MG

## 2021-12-21 RX ORDER — METHYLPREDNISOLONE SODIUM SUCCINATE 125 MG/2ML
125 INJECTION, POWDER, LYOPHILIZED, FOR SOLUTION INTRAMUSCULAR; INTRAVENOUS AS NEEDED
OUTPATIENT
Start: 2021-12-21

## 2021-12-21 RX ORDER — METHYLPREDNISOLONE SODIUM SUCCINATE 125 MG/2ML
125 INJECTION, POWDER, LYOPHILIZED, FOR SOLUTION INTRAMUSCULAR; INTRAVENOUS AS NEEDED
Status: DISCONTINUED | OUTPATIENT
Start: 2021-12-21 | End: 2021-12-23 | Stop reason: HOSPADM

## 2021-12-21 RX ORDER — DIPHENHYDRAMINE HCL 50 MG
50 CAPSULE ORAL ONCE AS NEEDED
OUTPATIENT
Start: 2021-12-21

## 2021-12-21 RX ORDER — EPINEPHRINE 1 MG/ML
0.3 INJECTION, SOLUTION INTRAMUSCULAR; SUBCUTANEOUS AS NEEDED
Status: DISCONTINUED | OUTPATIENT
Start: 2021-12-21 | End: 2021-12-23 | Stop reason: HOSPADM

## 2021-12-21 RX ORDER — DIPHENHYDRAMINE HYDROCHLORIDE 50 MG/ML
50 INJECTION INTRAMUSCULAR; INTRAVENOUS ONCE AS NEEDED
Status: DISCONTINUED | OUTPATIENT
Start: 2021-12-21 | End: 2021-12-23 | Stop reason: HOSPADM

## 2021-12-21 RX ORDER — EPINEPHRINE 1 MG/ML
0.3 INJECTION, SOLUTION INTRAMUSCULAR; SUBCUTANEOUS AS NEEDED
OUTPATIENT
Start: 2021-12-21

## 2021-12-21 RX ORDER — DIPHENHYDRAMINE HCL 50 MG
50 CAPSULE ORAL ONCE AS NEEDED
Status: DISCONTINUED | OUTPATIENT
Start: 2021-12-21 | End: 2021-12-23 | Stop reason: HOSPADM

## 2021-12-21 RX ORDER — DIPHENHYDRAMINE HYDROCHLORIDE 50 MG/ML
50 INJECTION INTRAMUSCULAR; INTRAVENOUS ONCE AS NEEDED
OUTPATIENT
Start: 2021-12-21

## 2021-12-21 RX ADMIN — SODIUM CHLORIDE: 9 INJECTION, SOLUTION INTRAVENOUS at 13:34

## 2022-04-19 ENCOUNTER — TRANSCRIBE ORDERS (OUTPATIENT)
Dept: ADMINISTRATIVE | Facility: HOSPITAL | Age: 82
End: 2022-04-19

## 2022-04-19 DIAGNOSIS — J32.4 CHRONIC PANSINUSITIS: ICD-10-CM

## 2022-04-19 DIAGNOSIS — R42 DIZZINESS: Primary | ICD-10-CM

## 2022-05-18 ENCOUNTER — HOSPITAL ENCOUNTER (OUTPATIENT)
Dept: CARDIOLOGY | Facility: HOSPITAL | Age: 82
Discharge: HOME | End: 2022-05-18

## 2022-05-18 ENCOUNTER — HOSPITAL ENCOUNTER (OUTPATIENT)
Dept: CT IMAGING | Facility: HOSPITAL | Age: 82
Discharge: HOME | End: 2022-05-18

## 2022-05-18 DIAGNOSIS — R42 DIZZINESS: ICD-10-CM

## 2022-05-18 DIAGNOSIS — J32.4 CHRONIC PANSINUSITIS: ICD-10-CM

## 2022-05-18 PROCEDURE — 70486 CT MAXILLOFACIAL W/O DYE: CPT | Performed by: RADIOLOGY

## 2022-05-18 PROCEDURE — 93880 EXTRACRANIAL BILAT STUDY: CPT | Performed by: RADIOLOGY

## 2022-05-18 PROCEDURE — 70486 CT MAXILLOFACIAL W/O DYE: CPT

## 2022-05-18 PROCEDURE — 93880 EXTRACRANIAL BILAT STUDY: CPT

## 2022-09-19 ENCOUNTER — TRANSCRIBE ORDERS (OUTPATIENT)
Dept: ADMINISTRATIVE | Facility: HOSPITAL | Age: 82
End: 2022-09-19

## 2022-09-19 DIAGNOSIS — N40.0 ENLARGED PROSTATE: Primary | ICD-10-CM

## 2022-10-05 ENCOUNTER — HOSPITAL ENCOUNTER (OUTPATIENT)
Dept: MRI IMAGING | Facility: HOSPITAL | Age: 82
Discharge: HOME | End: 2022-10-05
Admitting: NURSE PRACTITIONER

## 2022-10-05 DIAGNOSIS — N40.0 ENLARGED PROSTATE: ICD-10-CM

## 2022-10-05 LAB — CREAT BLDA-MCNC: 1.3 MG/DL (ref 0.6–1.3)

## 2022-10-05 PROCEDURE — 0 GADOBENATE DIMEGLUMINE 529 MG/ML SOLUTION: Performed by: NURSE PRACTITIONER

## 2022-10-05 PROCEDURE — A9577 INJ MULTIHANCE: HCPCS | Performed by: NURSE PRACTITIONER

## 2022-10-05 PROCEDURE — 72197 MRI PELVIS W/O & W/DYE: CPT

## 2022-10-05 PROCEDURE — 72197 MRI PELVIS W/O & W/DYE: CPT | Performed by: RADIOLOGY

## 2022-10-05 PROCEDURE — 82565 ASSAY OF CREATININE: CPT

## 2022-10-05 RX ADMIN — GADOBENATE DIMEGLUMINE 16 ML: 529 INJECTION, SOLUTION INTRAVENOUS at 15:30

## 2024-09-19 NOTE — THERAPY TREATMENT NOTE
Outpatient Physical Therapy Ortho Treatment Note  MICKY Lacey     Patient Name: Leodan Posadas  : 1940  MRN: [id number]  Today's Date: 2017      Visit Date: 2017    Visit Dx:    ICD-10-CM ICD-9-CM   1. Lumbar pain with radiation down right leg M54.5 724.2   2. Chronic pain of right knee M25.561 719.46    G89.29 338.29       Patient Active Problem List   Diagnosis   • CAD (coronary artery disease), native coronary artery   • Hyperlipidemia   • Hypertension   • PAC (premature atrial contraction)   • Medication intolerance   • Status post coronary artery stent placement   • Chest pain with moderate risk for cardiac etiology        Past Medical History:   Diagnosis Date   • Arthritis    • CAD (coronary artery disease)     s/p CABG with occluded grafts   • Carpal tunnel syndrome on both sides    • Hyperlipidemia    • Hypertension         Past Surgical History:   Procedure Laterality Date   • CARDIAC CATHETERIZATION     • CORONARY ARTERY BYPASS GRAFT     • HERNIA REPAIR     • KNEE ARTHROSCOPY     • PERIPHERAL ARTERIAL STENT GRAFT     • SHOULDER SURGERY Right    • SINUS SURGERY               PT Ortho       17 1200    Subjective Comments    Subjective Comments Patient reports that he is experiencing 6/10 pain today.  He notes that ESTIM helps to provide some relief.  -BE    Subjective Pain    Able to rate subjective pain? yes  -BE    Pre-Treatment Pain Level 6  -BE    Post-Treatment Pain Level 5  -BE      User Key  (r) = Recorded By, (t) = Taken By, (c) = Cosigned By    Initials Name Provider Type    BE Madisyn Wei, PT Physical Therapist                            PT Assessment/Plan       17 1517       PT Assessment    Assessment Comments Patient tolerated today's session well, with slight decrease in pain following therapy.  Patient performed ther ex in supine and sitting positions, with exercises focusing on improving knee ROM, LE strength, core strength, and lumbar ROM.  Mild tenderness  noted throughout lumbar paraspinals, with right more tender than left.  Session concluded with MH to the right knee and lumbar region; ESTIM to the lumbar region.  No adverse reactions following modalities.  Patient will continue to be progressed per his tolerance and POC.  -BE     PT Plan    PT Plan Comments Progress per patient's tolerance and POC.  -BE       User Key  (r) = Recorded By, (t) = Taken By, (c) = Cosigned By    Initials Name Provider Type    BE Madisyn Wei, PT Physical Therapist                Modalities       09/05/17 1200          Moist Heat    MH Applied Yes  -BE      Location right knee and lumbar  -BE      Rx Minutes 15 mins  -BE      MH S/P Rx Yes   with ESTIM to lumbar  -BE      ELECTRICAL STIMULATION    Attended/Unattended Unattended  -BE      Stimulation Type IFC  -BE      Location/Electrode Placement/Other per patient's tolerance  -BE      Rx Minutes 15 mins  -BE        User Key  (r) = Recorded By, (t) = Taken By, (c) = Cosigned By    Initials Name Provider Type    BE Madisyn Wei, PT Physical Therapist                Exercises       09/05/17 1200          Subjective Comments    Subjective Comments Patient reports that he is experiencing 6/10 pain today.  He notes that ESTIM helps to provide some relief.  -BE      Subjective Pain    Able to rate subjective pain? yes  -BE      Pre-Treatment Pain Level 6  -BE      Post-Treatment Pain Level 5  -BE      Exercise 1    Exercise Name 1 saq x20, ppt x20, qs x20, hs x20, ltr x20, sktc 3 x20sec, scap squeeze x20  -BE      Cueing 1 Tactile;Verbal;Demo  -BE      Time (Minutes) 1 25min  -BE        User Key  (r) = Recorded By, (t) = Taken By, (c) = Cosigned By    Initials Name Provider Type    BE Madisyn Wei PT Physical Therapist                        Manual Rx (last 36 hours)      Manual Treatments       09/05/17 1100          Manual Rx 1    Manual Rx 1 Location Lumbar  -BE      Manual Rx 1 Type Soft tissue mobilization  -BE       Manual Rx 1 Grade Per patient's tolerance  -BE      Manual Rx 1 Duration 15 min  -BE        User Key  (r) = Recorded By, (t) = Taken By, (c) = Cosigned By    Initials Name Provider Type    BE Madisyn Wei PT Physical Therapist                    Therapy Education       09/05/17 1205          Therapy Education    Given HEP;Symptoms/condition management;Pain management;Posture/body mechanics  -BE      Program Reinforced  -BE      How Provided Verbal;Demonstration  -BE      Provided to Patient  -BE      Level of Understanding Verbalized;Demonstrated  -BE        User Key  (r) = Recorded By, (t) = Taken By, (c) = Cosigned By    Initials Name Provider Type    BE Madisyn Wei PT Physical Therapist                Time Calculation:   Start Time: 1035  Stop Time: 1139  Time Calculation (min): 64 min    Therapy Charges for Today     Code Description Service Date Service Provider Modifiers Qty    18535138234 HC PT ELECTRICAL STIM UNATTENDED 9/5/2017 Madisyn Wei, PT  1    05937974416 HC PT MANUAL THERAPY EA 15 MIN 9/5/2017 Madisyn Wei, PT GP 1    30888700943 HC PT THER PROC EA 15 MIN 9/5/2017 Madisyn Wei, PT GP 2    94111015528 HC PT HOT/COLD PACK WC NONMCARE 9/5/2017 Madisyn Wei, PT GP 1                    Madisyn Wei, PT  9/5/2017        Patent